# Patient Record
Sex: FEMALE | Race: WHITE | NOT HISPANIC OR LATINO | Employment: FULL TIME | URBAN - METROPOLITAN AREA
[De-identification: names, ages, dates, MRNs, and addresses within clinical notes are randomized per-mention and may not be internally consistent; named-entity substitution may affect disease eponyms.]

---

## 2017-04-28 ENCOUNTER — OFFICE VISIT (OUTPATIENT)
Dept: FAMILY MEDICINE | Facility: CLINIC | Age: 22
End: 2017-04-28
Payer: COMMERCIAL

## 2017-04-28 VITALS
TEMPERATURE: 98 F | BODY MASS INDEX: 27.44 KG/M2 | HEART RATE: 75 BPM | SYSTOLIC BLOOD PRESSURE: 130 MMHG | DIASTOLIC BLOOD PRESSURE: 70 MMHG | OXYGEN SATURATION: 100 % | WEIGHT: 208 LBS

## 2017-04-28 DIAGNOSIS — B49 FUNGAL INFECTION: Primary | ICD-10-CM

## 2017-04-28 PROCEDURE — 99213 OFFICE O/P EST LOW 20 MIN: CPT | Performed by: NURSE PRACTITIONER

## 2017-04-28 RX ORDER — CLOTRIMAZOLE 1 %
CREAM (GRAM) TOPICAL 2 TIMES DAILY
Qty: 15 G | Refills: 1 | Status: ON HOLD | OUTPATIENT
Start: 2017-04-28 | End: 2023-11-22

## 2017-04-28 NOTE — NURSING NOTE
"Chief Complaint   Patient presents with     Derm Problem       Initial /70 (BP Location: Right arm, Patient Position: Chair, Cuff Size: Adult Regular)  Pulse 75  Temp 98  F (36.7  C) (Oral)  Wt 208 lb (94.3 kg)  SpO2 100%  BMI 27.44 kg/m2 Estimated body mass index is 27.44 kg/(m^2) as calculated from the following:    Height as of 10/21/16: 6' 1\" (1.854 m).    Weight as of this encounter: 208 lb (94.3 kg).  Medication Reconciliation: complete.Ravi BARRAZA MA      "

## 2017-04-28 NOTE — MR AVS SNAPSHOT
"              After Visit Summary   2017    Laura Singh    MRN: [id number]           Patient Information     Date Of Birth          1995        Visit Information        Provider Department      2017 11:45 AM Aissatou Levine NP [facility]        Today's Diagnoses     Fungal infection    -  1       Follow-ups after your visit        Who to contact     If you have questions or need follow up information about today's clinic visit or your schedule please contact [facility] directly at [phone].  Normal or non-critical lab and imaging results will be communicated to you by [employer], letter or phone within 4 business days after the clinic has received the results. If you do not hear from us within 7 days, please contact the clinic through [employer] or phone. If you have a critical or abnormal lab result, we will notify you by phone as soon as possible.  Submit refill requests through [employer] or call your pharmacy and they will forward the refill request to us. Please allow 3 business days for your refill to be completed.          Additional Information About Your Visit        MyChart Information     [employer] lets you send messages to your doctor, view your test results, renew your prescriptions, schedule appointments and more. To sign up, go to [email or web address] . Click on \"Log in\" on the left side of the screen, which will take you to the Welcome page. Then click on \"Sign up Now\" on the right side of the page.     You will be asked to enter the access code listed below, as well as some personal information. Please follow the directions to create your username and password.     Your access code is: [email or web address]  Expires: 2017 12:13 PM     Your access code will  in 90 days. If you need help or a new code, please call your [facility] or [phone].        Care EveryWhere ID     This is your Care EveryWhere ID. This could be used by other " organizations to access your [facility] medical records  [id number]        Your Vitals Were     Pulse Temperature Pulse Oximetry BMI (Body Mass Index)          75 98  F (36.7  C) (Oral) 100% 27.44 kg/m2         Blood Pressure from Last 3 Encounters:   04/28/17 130/70   10/21/16 120/80    Weight from Last 3 Encounters:   04/28/17 208 lb (94.3 kg)   10/21/16 225 lb (102.1 kg)              Today, you had the following     No orders found for display         Today's Medication Changes          These changes are accurate as of: 4/28/17 12:13 PM.  If you have any questions, ask your nurse or doctor.               Start taking these medicines.        Dose/Directions    clotrimazole 1 % cream   Commonly known as:  LOTRIMIN   Used for:  Fungal infection   Started by:  Aissatou Levine, KIMBERLEE        Apply topically 2 times daily   Quantity:  15 g   Refills:  1            Where to get your medicines      These medications were sent to [facility]/pharmacy #[id number] - [place], [place] - [address] [address]  [address] [address] [address]    Hours:  [facility] Phone:  [phone]     clotrimazole 1 % cream                Primary Care Provider Office Phone # Fax #    Vishal Almanza [phone]       [facility] [address] [address]        Thank you!     Thank you for choosing [facility]  for your care. Our goal is always to provide you with excellent care. Hearing back from our patients is one way we can continue to improve our services. Please take a few minutes to complete the written survey that you may receive in the mail after your visit with us. Thank you!             Your Updated Medication List - Protect others around you: Learn how to safely use, store and throw away your medicines at www.disposemymeds.org.          This list is accurate as of: 4/28/17 12:13 PM.  Always use your most recent med list.                   Brand Name Dispense  Instructions for use    clotrimazole 1 % cream    LOTRIMIN    15 g    Apply topically 2 times daily       JUNE 1/20 PO

## 2017-04-28 NOTE — PROGRESS NOTES
SUBJECTIVE:                                                    Laura Singh is a 21 year old female who presents to clinic today for the following health issues:      Rash     Onset: 1 month    Description:   Location: right hand  Character: round, raised, red  Itching (Pruritis): no     Progression of Symptoms:  same    Accompanying Signs & Symptoms:  Fever: no   Body aches or joint pain: no   Sore throat symptoms: no   Recent cold symptoms: no    History:   Previous similar rash: no     Precipitating factors:   Exposure to similar rash: no   New exposures: None   Recent travel: YES- traveled to [place], traveled by [facility]    Alleviating factors:  none     Therapies Tried and outcome: otc hydrocortisone cream    Rash on right hand present for the past month. Seen by dermatology and told that it was a sensitivity to sun. Applied hydrocortisone and no improvement. Red, itchy and not improving.       Problem list and histories reviewed & adjusted, as indicated.  Additional history: none    Patient Active Problem List   Diagnosis   (none) - all problems resolved or deleted     History reviewed. No pertinent surgical history.    Social History   Substance Use Topics     Smoking status: Never Smoker     Smokeless tobacco: Never Used     Alcohol use No     History reviewed. No pertinent family history.        Reviewed and updated as needed this visit by clinical staff  Tobacco  Allergies  Meds  Problems  Med Hx  Surg Hx  Fam Hx  Soc Hx        Reviewed and updated as needed this visit by Provider  Allergies  Meds  Problems  Med Hx  Surg Hx  Fam Hx         ROS:  Constitutional, HEENT, cardiovascular, pulmonary, gi and gu systems are negative, except as otherwise noted.    OBJECTIVE:                                                    /70 (BP Location: Right arm, Patient Position: Chair, Cuff Size: Adult Regular)  Pulse 75  Temp 98  F (36.7  C) (Oral)  Wt 208 lb (94.3 kg)  SpO2 100%  BMI 27.44  kg/m2  Body mass index is 27.44 kg/(m^2).  GENERAL: healthy, alert and no distress  SKIN: red rash with border and centralized clearing. Scaling noted. Localized to the right hand.          ASSESSMENT/PLAN:                                                            1. Fungal infection  Will have patient apply lotrimin antifungal cream twice daily for the next two weeks. If no improvement, advised to call and can try a topical steroid cream.   - clotrimazole (LOTRIMIN) 1 % cream; Apply topically 2 times daily  Dispense: 15 g; Refill: 1        Aissatou Levine NP  [facility]

## 2018-07-09 ENCOUNTER — OFFICE VISIT (OUTPATIENT)
Dept: URGENT CARE | Facility: URGENT CARE | Age: 23
End: 2018-07-09
Payer: COMMERCIAL

## 2018-07-09 VITALS
OXYGEN SATURATION: 98 % | HEART RATE: 82 BPM | DIASTOLIC BLOOD PRESSURE: 72 MMHG | TEMPERATURE: 98.4 F | RESPIRATION RATE: 16 BRPM | WEIGHT: 220 LBS | SYSTOLIC BLOOD PRESSURE: 110 MMHG | HEIGHT: 72 IN | BODY MASS INDEX: 29.8 KG/M2

## 2018-07-09 DIAGNOSIS — L56.8 PHOTODERMATITIS DUE TO SUN: Primary | ICD-10-CM

## 2018-07-09 PROCEDURE — 99213 OFFICE O/P EST LOW 20 MIN: CPT | Performed by: FAMILY MEDICINE

## 2018-07-09 RX ORDER — TRIAMCINOLONE ACETONIDE 1 MG/G
CREAM TOPICAL 2 TIMES DAILY
Qty: 80 G | Refills: 1 | Status: ON HOLD | OUTPATIENT
Start: 2018-07-09 | End: 2023-11-22

## 2018-07-09 NOTE — MR AVS SNAPSHOT
After Visit Summary   7/9/2018    Laura Singh    MRN: [id number]           Patient Information     Date Of Birth          1995        Visit Information        Provider Department      7/9/2018 6:05 PM Trudy Slaughter MD [facility]        Today's Diagnoses     Photodermatitis due to sun    -  1      Care Instructions      Plant and Sun Dermatitis (Phytophotodermatitis)  Phytophotodermatitis is an inflammatory reaction of the skin. It occurs after contact with the leaves and stems of certain plants, followed by exposure to sunlight. It starts with a burning sensation followed by a red rash, and sometimes blisters. This starts about 24 hours after exposure. The rash gets worse over 1 to 3 days, then symptoms start to get better. There is usually no itching. The rash is not contagious. If you are not able to link the skin reaction to a plant, it could be from medicines that have photoallergic reactions such as some sunscreens, NSAIDs, or antibiotics.  As symptoms get better, the rash may look darker or lighter for weeks to months before it fully fades. Sometimes these color changes are permanent.  This skin reaction is not an allergic or immune reaction, but a reaction between the plant substance and the skin cells. Sunlight is needed to start the reaction. The lining of the skin cells is damaged. The cells die and flake off (like a bad sunburn).  The plants that cause this reaction are found throughout the [place] and the world. Plants include meadow grass, parsley, carrots, parsnip, celery, limes, fig leaves, lemon, mustard, and chrysanthemums.  Treatment involves washing the skin to remove any remaining plant oils, avoiding sun during the acute phase of the rash, and treating symptoms.  Home care  The following guidelines can help you care for yourself at home:    On the first day, use ice packs to relieve severe pain. Some over-the-counter first-aid creams and sprays  contain an anesthetic which also relieves pain.    If a dressing was applied, change it once a day. If the bandage sticks, soak it off in warm water.    Wash the affected area every day with soap and water. Pat dry with a clean towel. Use hydrocortisone cream to help with inflammation.    You may use acetaminophen or ibuprofen to control pain, unless another medicine was given.  If you have chronic liver or kidney disease or ever had a stomach ulcer or gastrointestinal bleeding, talk with your doctor before using these medicines. Do not use ibuprofen in children under 6 months of age.    If you know what plant caused your reaction, avoid this plant in the future.  Follow-up care  Follow up with your doctor, or as advised. The skin usually heals without infection. Occasionally, an infection may occur even with proper treatment. Watch for the signs of infection listed below.  When to seek medical advice  Call your healthcare provider right away if any of these occur:    Increasing pain    Increasing redness, swelling, or pus coming from the wound    Fever of 100.4 F (38 C) or higher, or as directed by your healthcare provider  Date Last Reviewed: 9/1/2016 2000-2017 [employer]. [address] [place] [address]. All rights reserved. This information is not intended as a substitute for professional medical care. Always follow your healthcare professional's instructions.                Follow-ups after your visit        Who to contact     If you have questions or need follow up information about today's clinic visit or your schedule please contact [facility] directly at [phone].  Normal or non-critical lab and imaging results will be communicated to you by MyChart, letter or phone within 4 business days after the clinic has received the results. If you do not hear from us within 7 days, please contact the clinic through MyChart or phone. If you have a critical or  "abnormal lab result, we will notify you by phone as soon as possible.  Submit refill requests through [employer] or call your pharmacy and they will forward the refill request to us. Please allow 3 business days for your refill to be completed.          Additional Information About Your Visit        Care EveryWhere ID     This is your Care EveryWhere ID. This could be used by other organizations to access your [place] medical records  [id number]        Your Vitals Were     Pulse Temperature Respirations Height Last Period Pulse Oximetry    82 98.4  F (36.9  C) (Oral) 16 6' 1\" (1.854 m) 06/25/2018 (Exact Date) 98%    Breastfeeding? BMI (Body Mass Index)                No 29.03 kg/m2           Blood Pressure from Last 3 Encounters:   07/09/18 110/72   04/28/17 130/70   10/21/16 120/80    Weight from Last 3 Encounters:   07/09/18 220 lb (99.8 kg)   04/28/17 208 lb (94.3 kg)   10/21/16 225 lb (102.1 kg)              Today, you had the following     No orders found for display         Today's Medication Changes          These changes are accurate as of 7/9/18  6:56 PM.  If you have any questions, ask your nurse or doctor.               Start taking these medicines.        Dose/Directions    triamcinolone 0.1 % cream   Commonly known as:  KENALOG   Used for:  Photodermatitis due to sun   Started by:  Trudy Slaughter MD        Apply topically 2 times daily   Quantity:  80 g   Refills:  1            Where to get your medicines      These medications were sent to [facility]/pharmacy #[id number] - [place], [place] - [address] [address]  [address] [address] [address]    Hours:  [facility] Phone:  [phone]     triamcinolone 0.1 % cream                Primary Care Provider Office Phone # Fax #    Vishal Almanza [phone]       [facility] [address] [address]        Equal Access to Services     ОЛЬГА WALLACE AH: Hadii beverly [place] " harsha steennirmal chenhermelinda snell ah. So [facility] [phone].    ATENCIÓN: Si leslie frost, tiene a burden disposición servicios gratuitos de asistencia lingüística. Andrewame al [phone].    We comply with applicable federal civil rights laws and [place] laws. We do not discriminate on the basis of race, color, national origin, age, disability, sex, sexual orientation, or gender identity.            Thank you!     Thank you for choosing [facility]  for your care. Our goal is always to provide you with excellent care. Hearing back from our patients is one way we can continue to improve our services. Please take a few minutes to complete the written survey that you may receive in the mail after your visit with us. Thank you!             Your Updated Medication List - Protect others around you: Learn how to safely use, store and throw away your medicines at www.disposemymeds.org.          This list is accurate as of 7/9/18  6:56 PM.  Always use your most recent med list.                   Brand Name Dispense Instructions for use Diagnosis    clotrimazole 1 % cream    LOTRIMIN    15 g    Apply topically 2 times daily    Fungal infection       JUNEL 1/20 PO           triamcinolone 0.1 % cream    KENALOG    80 g    Apply topically 2 times daily    Photodermatitis due to sun

## 2018-07-09 NOTE — PATIENT INSTRUCTIONS
Plant and Sun Dermatitis (Phytophotodermatitis)  Phytophotodermatitis is an inflammatory reaction of the skin. It occurs after contact with the leaves and stems of certain plants, followed by exposure to sunlight. It starts with a burning sensation followed by a red rash, and sometimes blisters. This starts about 24 hours after exposure. The rash gets worse over 1 to 3 days, then symptoms start to get better. There is usually no itching. The rash is not contagious. If you are not able to link the skin reaction to a plant, it could be from medicines that have photoallergic reactions such as some sunscreens, NSAIDs, or antibiotics.  As symptoms get better, the rash may look darker or lighter for weeks to months before it fully fades. Sometimes these color changes are permanent.  This skin reaction is not an allergic or immune reaction, but a reaction between the plant substance and the skin cells. Sunlight is needed to start the reaction. The lining of the skin cells is damaged. The cells die and flake off (like a bad sunburn).  The plants that cause this reaction are found throughout the [place] and the world. Plants include meadow grass, parsley, carrots, parsnip, celery, limes, fig leaves, lemon, mustard, and chrysanthemums.  Treatment involves washing the skin to remove any remaining plant oils, avoiding sun during the acute phase of the rash, and treating symptoms.  Home care  The following guidelines can help you care for yourself at home:    On the first day, use ice packs to relieve severe pain. Some over-the-counter first-aid creams and sprays contain an anesthetic which also relieves pain.    If a dressing was applied, change it once a day. If the bandage sticks, soak it off in warm water.    Wash the affected area every day with soap and water. Pat dry with a clean towel. Use hydrocortisone cream to help with inflammation.    You may use acetaminophen or ibuprofen to control pain, unless another medicine was  given.  If you have chronic liver or kidney disease or ever had a stomach ulcer or gastrointestinal bleeding, talk with your doctor before using these medicines. Do not use ibuprofen in children under 6 months of age.    If you know what plant caused your reaction, avoid this plant in the future.  Follow-up care  Follow up with your doctor, or as advised. The skin usually heals without infection. Occasionally, an infection may occur even with proper treatment. Watch for the signs of infection listed below.  When to seek medical advice  Call your healthcare provider right away if any of these occur:    Increasing pain    Increasing redness, swelling, or pus coming from the wound    Fever of 100.4 F (38 C) or higher, or as directed by your healthcare provider  Date Last Reviewed: 9/1/2016 2000-2017 [employer]. [address] [place] [address], [place] [address]. All rights reserved. This information is not intended as a substitute for professional medical care. Always follow your healthcare professional's instructions.

## 2018-07-10 NOTE — PROGRESS NOTES
"SUBJECTIVE:  Chief Complaint   Patient presents with     Derm Problem     mid chest x 3 days, applied some benedryl and hydrocortizone, stings, redness       Laura Singh is a 22 year old female who presents to the clinic today for a rash.  Onset of rash was 3 day(s) ago.   Rash is sudden onset, still present and constant.  Location of the rash: chest. Vshaple of the collar Quality/symptoms of rash: itching, burning, painful, red and blistering   Symptoms are moderate and rash seems to be not changing over the course of time.  Previous history of a similar rash? No  Recent exposure history: was outdoors with sun exposure  Not taking medications or skin products that make her sun sensitive    Associated symptoms include: nothing.  Patient denies: throat tightness, wheezing, cough, tongue/lip swelling, sore throat and URI symptoms.    History reviewed. No pertinent past medical history.  Patient Active Problem List   Diagnosis   (none) - all problems resolved or deleted       ALLERGIES:  Review of patient's allergies indicates no known allergies.      Current Outpatient Prescriptions on File Prior to Visit:  clotrimazole (LOTRIMIN) 1 % cream Apply topically 2 times daily   Norethindrone Acet-Ethinyl Est (JUNEL 1/20 PO)      No current facility-administered medications on file prior to visit.     Social History   Substance Use Topics     Smoking status: Never Smoker     Smokeless tobacco: Never Used     Alcohol use No       History reviewed. No pertinent family history.      ROS:  CONSTITUTIONAL:NEGATIVE for fever, chills   EYES: NEGATIVE for vision changes or irritation  ENT/MOUTH: NEGATIVE for ear, mouth and throat problems  RESP:NEGATIVE for significant cough or SOB    EXAM:   /72 (BP Location: Right arm, Patient Position: Chair, Cuff Size: Adult Large)  Pulse 82  Temp 98.4  F (36.9  C) (Oral)  Resp 16  Ht 6' 1\" (1.854 m)  Wt 220 lb (99.8 kg)  LMP 06/25/2018 (Exact Date)  SpO2 98%  Breastfeeding? No "  BMI 29.03 kg/m2  GENERAL: alert, no acute distress.  SKIN: Rash description:    Distribution: localized  Location: chest  - in v shape in the shape of collar  Color: red,  Lesion type: maculopapular, confluent with tenderness and inflammation  GENERAL APPEARANCE: healthy, alert and no distress  EYES: EOMI,    conjunctiva clear  NECK: supple, non-tender to palpation, no adenopathy noted  RESP: lungs clear to auscultation - no rales, rhonchi or wheezes  CV: regular rates and rhythm, normal S1 S2, no murmur noted    ASSESSMENT:  Photodermatitis due to sun     - triamcinolone (KENALOG) 0.1 % cream; Apply topically 2 times daily     Triamcinolone cream, apply bid for itching, inflammation          Follow-up with primary clinic if not improving

## 2020-01-09 ENCOUNTER — OFFICE VISIT (OUTPATIENT)
Dept: URGENT CARE | Facility: URGENT CARE | Age: 25
End: 2020-01-09
Payer: COMMERCIAL

## 2020-01-09 VITALS
TEMPERATURE: 97.7 F | HEIGHT: 72 IN | OXYGEN SATURATION: 98 % | WEIGHT: 200 LBS | BODY MASS INDEX: 27.09 KG/M2 | SYSTOLIC BLOOD PRESSURE: 128 MMHG | DIASTOLIC BLOOD PRESSURE: 79 MMHG | HEART RATE: 86 BPM

## 2020-01-09 DIAGNOSIS — R11.2 NAUSEA AND VOMITING, INTRACTABILITY OF VOMITING NOT SPECIFIED, UNSPECIFIED VOMITING TYPE: ICD-10-CM

## 2020-01-09 DIAGNOSIS — J01.10 ACUTE NON-RECURRENT FRONTAL SINUSITIS: ICD-10-CM

## 2020-01-09 DIAGNOSIS — R68.89 FLU-LIKE SYMPTOMS: Primary | ICD-10-CM

## 2020-01-09 LAB
FLUAV+FLUBV AG SPEC QL: NEGATIVE
FLUAV+FLUBV AG SPEC QL: NEGATIVE
SPECIMEN SOURCE: NORMAL

## 2020-01-09 PROCEDURE — 99213 OFFICE O/P EST LOW 20 MIN: CPT | Performed by: FAMILY MEDICINE

## 2020-01-09 PROCEDURE — 87804 INFLUENZA ASSAY W/OPTIC: CPT | Performed by: FAMILY MEDICINE

## 2020-01-09 RX ORDER — IBUPROFEN 200 MG
200 TABLET ORAL EVERY 4 HOURS PRN
Status: ON HOLD | COMMUNITY
End: 2023-11-22

## 2020-01-09 RX ORDER — AMOXICILLIN 500 MG/1
500 TABLET, FILM COATED ORAL 2 TIMES DAILY
Status: ON HOLD | COMMUNITY
End: 2023-11-22

## 2020-01-09 ASSESSMENT — MIFFLIN-ST. JEOR: SCORE: 1775.07

## 2020-01-10 NOTE — PATIENT INSTRUCTIONS
Sinus symptoms of congestion/frontal sinus pressure  -- fluticasone nasal spray once a day into each nostril  -- when congested use neti pots/nasal saline rinses      STOP the amoxicillin as this can be causing your upset stomach      Take tylenol every 4-6 hours for pain      Stay hydrated: 4-6 water bottles a day      If you develop new or worsening symptoms please call the clinic or return to be evaluated

## 2020-01-10 NOTE — PROGRESS NOTES
"Subjective:   Laura Singh is a 24 year old female who presents for   Chief Complaint   Patient presents with     Urgent Care     Pt in clinic to have eval for flu symptoms.     Nasal Congestion     Headache     Vomiting     Diarrhea     Sweats     Generalized Body Aches     Fatigue     Patient went on virtuwell yesterday and given amoxicillin -- for presumed sinusitis. Has been vomiting since then  Has had amoxicillin in the past. She did vomit one other time about 3 days ago (isolated episode)   She denies sinus pressure of maxillary sinuses but has frontal headache present every day. Occasionally gets a sinus infection. Feels warm intermittently and clammy at times. Reports 1 week of illness, started with loose stools and not feeling well at that time after eating noodles of some sort with her family (no others were sick). No present cough at this time. No diarrhea at this time.    Denies abdominal pain. 5 days total of sinus pressure  She did not get the flu shot this year.    Patient works for a bank  There are no active problems to display for this patient.      Current Outpatient Medications   Medication     amoxicillin (AMOXIL) 500 MG tablet     ibuprofen (ADVIL/MOTRIN) 200 MG tablet     Norethindrone Acet-Ethinyl Est (JUNEL 1/20 PO)     clotrimazole (LOTRIMIN) 1 % cream     triamcinolone (KENALOG) 0.1 % cream     No current facility-administered medications for this visit.        ROS:  As above per HPI    Objective:   /79   Pulse 86   Temp 97.7  F (36.5  C) (Oral)   Ht 1.854 m (6' 1\")   Wt 90.7 kg (200 lb)   SpO2 98%   BMI 26.39 kg/m  , Body mass index is 26.39 kg/m .  Gen:  NAD, well-nourished, sitting in chair comfortably  HEENT: EOMI, sclera anicteric, Head normocephalic, ; nares patent; moist mucous membranes  Neck: trachea midline, no thyromegaly  CV:  Hemodynamically stable, RRR  Pulm:  no increased work of breathing , CTAB, no wheezes/rales/rhonchi   ABD: soft, non-distended, " normoactive, no palpable masses, no guarding  Extrem: no cyanosis, edema or clubbing  Skin: no obvious rashes or abnormalities  Psych: Euthymic, linear thoughts, normal rate of speech    Results for orders placed or performed in visit on 01/09/20   Influenza A/B antigen     Status: None   Result Value Ref Range    Influenza A/B Agn Specimen Nasopharyngeal     Influenza A Negative NEG^Negative    Influenza B Negative NEG^Negative     Assessment & Plan:     Laura Singh, 24 year old female who presents with:  Flu-like symptoms  Frontal Sinusitis  Recommend fluticasone nasal spray and nasal saline irrigation/rinses as her symptoms present just 5 days and may be more indicative of a viral infection.   - Influenza A/B antigen    Nausea and vomiting, intractability of vomiting not specified, unspecified vomiting type  Possible intolerance to amoxicillin, recommend she discontinue as she likely has a viral sinusitis (reports only 5 days of sinus frontal pressure) . The vomiting > 1 week ago likely from food poisoning. Current vomiting likely from antibiotic use, symptoms not convincing for viral gastroenteritis (normal abd exam and without loose stools). Good oral hydration encouraged. F/u if not better in next few days      Rolando Chao MD   [facility]    The use of Dragon/[employer] dictation services may have been used to construct the content in this note; any grammatical or spelling errors are non-intentional. Please contact the author of this note directly if you are in need of any clarification.

## 2023-11-22 ENCOUNTER — TRANSFERRED RECORDS (OUTPATIENT)
Dept: OBGYN | Facility: CLINIC | Age: 28
End: 2023-11-22

## 2023-11-22 ENCOUNTER — HOSPITAL ENCOUNTER (OUTPATIENT)
Facility: CLINIC | Age: 28
Discharge: HOME | End: 2023-11-23
Attending: OBSTETRICS & GYNECOLOGY | Admitting: OBSTETRICS & GYNECOLOGY
Payer: COMMERCIAL

## 2023-11-22 DIAGNOSIS — F41.9 ANXIETY: Primary | ICD-10-CM

## 2023-11-22 DIAGNOSIS — I10 CHRONIC HYPERTENSION: ICD-10-CM

## 2023-11-22 LAB
ALBUMIN MFR UR ELPH: <6 MG/DL
ALBUMIN SERPL BCG-MCNC: 3.7 G/DL (ref 3.5–5.2)
ALP SERPL-CCNC: 79 U/L (ref 40–150)
ALT SERPL W P-5'-P-CCNC: 15 U/L (ref 0–50)
ANION GAP SERPL CALCULATED.3IONS-SCNC: 9 MMOL/L (ref 7–15)
AST SERPL W P-5'-P-CCNC: 17 U/L (ref 0–45)
BILIRUB SERPL-MCNC: <0.2 MG/DL
BUN SERPL-MCNC: 7.2 MG/DL (ref 6–20)
CALCIUM SERPL-MCNC: 9.2 MG/DL (ref 8.6–10)
CHLORIDE SERPL-SCNC: 101 MMOL/L (ref 98–107)
CLUE CELLS: PRESENT
CREAT SERPL-MCNC: 0.52 MG/DL (ref 0.51–0.95)
CREAT UR-MCNC: 44.5 MG/DL
CRYSTALS AMN MICRO: NORMAL
DEPRECATED HCO3 PLAS-SCNC: 25 MMOL/L (ref 22–29)
EGFRCR SERPLBLD CKD-EPI 2021: >90 ML/MIN/1.73M2
ERYTHROCYTE [DISTWIDTH] IN BLOOD BY AUTOMATED COUNT: 13.5 % (ref 10–15)
GLUCOSE SERPL-MCNC: 92 MG/DL (ref 70–99)
HCT VFR BLD AUTO: 37.3 % (ref 35–47)
HGB BLD-MCNC: 12.4 G/DL (ref 11.7–15.7)
MCH RBC QN AUTO: 29.3 PG (ref 26.5–33)
MCHC RBC AUTO-ENTMCNC: 33.2 G/DL (ref 31.5–36.5)
MCV RBC AUTO: 88 FL (ref 78–100)
PLATELET # BLD AUTO: 328 10E3/UL (ref 150–450)
POTASSIUM SERPL-SCNC: 3.8 MMOL/L (ref 3.4–5.3)
PROT SERPL-MCNC: 6.6 G/DL (ref 6.4–8.3)
PROT/CREAT 24H UR: NORMAL MG/G{CREAT}
RBC # BLD AUTO: 4.23 10E6/UL (ref 3.8–5.2)
SODIUM SERPL-SCNC: 135 MMOL/L (ref 135–145)
TRICHOMONAS, WET PREP: ABNORMAL
WBC # BLD AUTO: 13.4 10E3/UL (ref 4–11)
WBC'S/HIGH POWER FIELD, WET PREP: ABNORMAL
YEAST, WET PREP: ABNORMAL

## 2023-11-22 PROCEDURE — 87210 SMEAR WET MOUNT SALINE/INK: CPT | Performed by: OBSTETRICS & GYNECOLOGY

## 2023-11-22 PROCEDURE — 36415 COLL VENOUS BLD VENIPUNCTURE: CPT | Performed by: OBSTETRICS & GYNECOLOGY

## 2023-11-22 PROCEDURE — G0463 HOSPITAL OUTPT CLINIC VISIT: HCPCS

## 2023-11-22 PROCEDURE — 84156 ASSAY OF PROTEIN URINE: CPT | Performed by: OBSTETRICS & GYNECOLOGY

## 2023-11-22 PROCEDURE — 250N000013 HC RX MED GY IP 250 OP 250 PS 637: Performed by: OBSTETRICS & GYNECOLOGY

## 2023-11-22 PROCEDURE — 85027 COMPLETE CBC AUTOMATED: CPT | Performed by: OBSTETRICS & GYNECOLOGY

## 2023-11-22 PROCEDURE — 80053 COMPREHEN METABOLIC PANEL: CPT | Performed by: OBSTETRICS & GYNECOLOGY

## 2023-11-22 RX ORDER — SODIUM CHLORIDE, SODIUM LACTATE, POTASSIUM CHLORIDE, CALCIUM CHLORIDE 600; 310; 30; 20 MG/100ML; MG/100ML; MG/100ML; MG/100ML
10-125 INJECTION, SOLUTION INTRAVENOUS CONTINUOUS
Status: DISCONTINUED | OUTPATIENT
Start: 2023-11-22 | End: 2023-11-23 | Stop reason: HOSPADM

## 2023-11-22 RX ORDER — ASPIRIN 81 MG/1
81 TABLET, CHEWABLE ORAL DAILY
Status: ON HOLD | COMMUNITY
End: 2024-03-04

## 2023-11-22 RX ORDER — NIFEDIPINE 30 MG/1
30 TABLET, EXTENDED RELEASE ORAL DAILY
Status: COMPLETED | OUTPATIENT
Start: 2023-11-22 | End: 2023-11-22

## 2023-11-22 RX ORDER — NIFEDIPINE 30 MG/1
30 TABLET, EXTENDED RELEASE ORAL DAILY
Status: DISCONTINUED | OUTPATIENT
Start: 2023-11-23 | End: 2023-11-22

## 2023-11-22 RX ORDER — METRONIDAZOLE 500 MG/1
500 TABLET ORAL 2 TIMES DAILY
Status: DISCONTINUED | OUTPATIENT
Start: 2023-11-22 | End: 2023-11-23 | Stop reason: HOSPADM

## 2023-11-22 RX ORDER — LABETALOL HYDROCHLORIDE 5 MG/ML
20 INJECTION, SOLUTION INTRAVENOUS
Status: DISCONTINUED | OUTPATIENT
Start: 2023-11-22 | End: 2023-11-23 | Stop reason: HOSPADM

## 2023-11-22 RX ORDER — NIFEDIPINE 10 MG/1
10-20 CAPSULE ORAL
Status: DISCONTINUED | OUTPATIENT
Start: 2023-11-22 | End: 2023-11-23 | Stop reason: HOSPADM

## 2023-11-22 RX ORDER — LIDOCAINE 40 MG/G
CREAM TOPICAL
Status: DISCONTINUED | OUTPATIENT
Start: 2023-11-22 | End: 2023-11-23 | Stop reason: HOSPADM

## 2023-11-22 RX ADMIN — NIFEDIPINE 10 MG: 10 CAPSULE ORAL at 20:11

## 2023-11-22 RX ADMIN — SERTRALINE HYDROCHLORIDE 100 MG: 50 TABLET ORAL at 22:06

## 2023-11-22 RX ADMIN — METRONIDAZOLE 500 MG: 500 TABLET ORAL at 22:06

## 2023-11-22 RX ADMIN — NIFEDIPINE 30 MG: 30 TABLET, FILM COATED, EXTENDED RELEASE ORAL at 22:06

## 2023-11-22 ASSESSMENT — ACTIVITIES OF DAILY LIVING (ADL)
ADLS_ACUITY_SCORE: 35

## 2023-11-23 ENCOUNTER — HOSPITAL ENCOUNTER (OUTPATIENT)
Facility: CLINIC | Age: 28
End: 2023-11-23
Admitting: OBSTETRICS & GYNECOLOGY
Payer: COMMERCIAL

## 2023-11-23 VITALS
HEIGHT: 72 IN | SYSTOLIC BLOOD PRESSURE: 142 MMHG | WEIGHT: 293 LBS | DIASTOLIC BLOOD PRESSURE: 66 MMHG | TEMPERATURE: 98 F | RESPIRATION RATE: 18 BRPM | BODY MASS INDEX: 39.68 KG/M2

## 2023-11-23 PROCEDURE — G0463 HOSPITAL OUTPT CLINIC VISIT: HCPCS

## 2023-11-23 PROCEDURE — 250N000013 HC RX MED GY IP 250 OP 250 PS 637: Performed by: OBSTETRICS & GYNECOLOGY

## 2023-11-23 RX ORDER — METRONIDAZOLE 500 MG/1
500 TABLET ORAL 2 TIMES DAILY
Qty: 14 TABLET | Refills: 0 | Status: SHIPPED | OUTPATIENT
Start: 2023-11-23 | End: 2023-11-30

## 2023-11-23 RX ORDER — NIFEDIPINE 30 MG/1
30 TABLET, EXTENDED RELEASE ORAL DAILY
Qty: 14 TABLET | Refills: 0 | Status: ON HOLD | OUTPATIENT
Start: 2023-11-23 | End: 2024-03-04

## 2023-11-23 RX ORDER — NIFEDIPINE 30 MG
30 TABLET, EXTENDED RELEASE ORAL DAILY
Qty: 90 TABLET | Refills: 3 | Status: ON HOLD | OUTPATIENT
Start: 2023-11-23 | End: 2024-03-05

## 2023-11-23 RX ADMIN — METRONIDAZOLE 500 MG: 500 TABLET ORAL at 08:05

## 2023-11-23 ASSESSMENT — ACTIVITIES OF DAILY LIVING (ADL)
ADLS_ACUITY_SCORE: 35

## 2023-11-23 NOTE — PLAN OF CARE
Data: [facility]: 2023  6:37 PM.  Reason for maternal/fetal assessment is leaking vaginal fluid. Patient reports that she thought maybe she ruptured a week ago but never mentioned it to her doctor because she said it was such a small amount.  Pt stated there was more fluid today.  Patient is a .  Prenatal record reviewed. Pregnancy has been uncomplicated..  Gestational Age 22w6d. VSS. Fetal movement active. Patient denies uterine contractions, vaginal bleeding, abdominal pain, pelvic pressure, nausea, vomiting, headache, visual disturbances, epigastric or URQ pain, significant edema. Support person is present.   Action: Verbal consent for EFM. Triage assessment completed. Bill of rights reviewed.  Response: Patient verbalized agreement with plan. Will contact Dr Carol Roberto with update and for further orders.

## 2023-11-23 NOTE — DISCHARGE SUMMARY
See daily progress note.     Started on Nifed 30mg for cHTN and increased to 100mg Zoloft for anxiety. Follow up in clinic next week and check BPs daily at home.     RASHID REED MD  11/23/23, 7:15 AM

## 2023-11-23 NOTE — PLAN OF CARE
Data: Patient assessed in the [facility] for elevated blood pressures.  Cervical exam not examined.  Membranes intact.  Contractions/uterine assessment none.  Action:  Presumed adequate fetal oxygenation documented (see flow record). Discharge instructions reviewed.  Patient instructed to report change in fetal movement, vaginal leaking of fluid or bleeding, abdominal pain, or any concerns related to the pregnancy to her nurse/physician.    Response: Orders to discharge home per Lorena Booker.  Patient verbalized understanding of education and verbalized agreement with plan. Discharged to home at 0825.

## 2023-11-23 NOTE — PROGRESS NOTES
"Subjective:  28 year old  at 23w0d who presented with concern for leaking but no e/o PROM, then admitted for elevated BPs.    No HA, vis changes, swelling, RUQ pain. Started on Nifedipine 30mg XL last night.     Objective:  Vitals:   Last vitals: /58 (BP Location: Right arm, Patient Position: Semi-Garcia's, Cuff Size: Adult Large)   Temp 97.4  F (36.3  C) (Oral)   Resp 18   Ht 1.854 m (6' 1\")   Wt 145.1 kg (320 lb)   BMI 42.22 kg/m    Vital Range in last 24 hours:  Temp:  [97.4  F (36.3  C)-97.8  F (36.6  C)] 97.4  F (36.3  C)  Resp:  [18] 18  BP: (123-174)/(58-83) 123/58  General: In no acute distress.  Cardiovascular: Regular rate  Pulmonary: Non-labored  Abdomen: Gravid    Hemoglobin   Date Value Ref Range Status   2023 12.4 11.7 - 15.7 g/dL Final     Platelet Count   Date Value Ref Range Status   2023 328 150 - 450 10e3/uL Final     AST   Date Value Ref Range Status   2023 17 0 - 45 U/L Final     Comment:     Reference intervals for this test were updated on 2023 to more accurately reflect our healthy population. There may be differences in the flagging of prior results with similar values performed with this method. Interpretation of those prior results can be made in the context of the updated reference intervals.     ALT   Date Value Ref Range Status   2023 15 0 - 50 U/L Final     Comment:     Reference intervals for this test were updated on 2023 to more accurately reflect our healthy population. There may be differences in the flagging of prior results with similar values performed with this method. Interpretation of those prior results can be made in the context of the updated reference intervals.       Creatinine   Date Value Ref Range Status   2023 0.52 0.51 - 0.95 mg/dL Final     UPC too low to calculate    Assessment/Plan  Laura Singh is a 28 year old  presents with cHTN vs gHTN  # HTN - has had elevated BPs in clinic but decrease by end of " visit so has been called white coat HTN. With today's presentation, could be more of a cHTN picture vs less likely gHTN at 22w.  - Continue Nifed 30 XL  - Labs within normal limits  - Pt to get BP cuff for home  - Follow up in office next week  - May need to consider increase in zoloft    RASHID REED MD

## 2023-11-23 NOTE — PROVIDER NOTIFICATION
11/22/23 1952   Provider Notification   Provider Name/Title Dr. KEN Roberto   Method of Notification Phone   Request Evaluate - Remote   Notification Reason Patient Arrived     Dr. Roberto notified of Pt. Arrival. Fern and Wet prep for rule out rupture. Two severe blood pressures noted in the last hour, no s/s Pre-E noted.    TORB for CBC, CMP, protein random urine and Nifedipine order set. Notify when labs are back.

## 2023-11-23 NOTE — DISCHARGE INSTRUCTIONS
"Home Blood Pressure Test: About This Test  What is it?     A home blood pressure test allows you to keep track of your blood pressure at home. Blood pressure is a measure of the force of blood against the walls of your arteries. Blood pressure readings include two numbers, such as 130/80 (say \"130 over 80\"). The first number is the systolic pressure. The second number is the diastolic pressure.  Why is this test done?  You may do this test at home to:  Find out if you have high blood pressure.  Track your blood pressure if you have high blood pressure.  Track how well medicine is working to reduce high blood pressure.  Check how lifestyle changes, such as weight loss and exercise, are affecting blood pressure.  How do you prepare for the test?  For at least 30 minutes before you take your blood pressure, don't exercise, drink caffeine, or smoke. Empty your bladder before the test. Sit quietly with your back straight and both feet on the floor for at least 5 minutes. This helps you take your blood pressure while you feel comfortable and relaxed.  How is the test done?  If your doctor recommends it, take your blood pressure twice a day. Take it in the morning and evening.  Sit with your arm slightly bent and resting on a table so that your upper arm is at the same level as your heart.  Use the same arm each time you take your blood pressure.  Place the blood pressure cuff on the bare skin of your upper arm. You may have to roll up your sleeve, remove your arm from the sleeve, or take your shirt off.  Wrap the blood pressure cuff around your upper arm so that the lower edge of the cuff is about 1 inch above the bend of your elbow.  Do not move, talk, or text while you take your blood pressure.  Follow the instructions that came with your blood pressure monitor. They might be different from the following.  Press the on/off button on the automatic monitor. Then you may need to wait until the screen says the monitor is " "ready.  Press the start button. The cuff will inflate and deflate by itself.  Your blood pressure numbers will appear on the screen.  Wait one minute and take your blood pressure again.  If your monitor does not automatically save your numbers, write them in your log book, along with the date and time.  Follow-up care is a key part of your treatment and safety. Be sure to make and go to all appointments, and call your doctor if you are having problems. It's also a good idea to keep a list of the medicines you take.  Where can you learn more?  Go to https://www.[employer].net/patiented  Enter C427 in the search box to learn more about \"Home Blood Pressure Test: About This Test.\"  Current as of: June 25, 2023               Content Version: 13.8    1824-5242 [employer].   Care instructions adapted under license by your healthcare professional. If you have questions about a medical condition or this instruction, always ask your healthcare professional. [employer] disclaims any warranty or liability for your use of this information.    Discharge Instruction for Undelivered Patients      You were seen for:  High blood pressure  We Consulted: Dr. Roberto, Dr. Booker  You had (Test or Medicine): blood pressure monitoring, labs, and doppler fetal heart tones.     Diet:   Drink 8 to 12 glasses of liquids (milk, juice, water) every day.  You may eat meals and snacks.     Activity:  Count fetal kicks everyday (see handout)  Call your doctor or nurse midwife if your baby is moving less than usual.     Call your provider if you notice:  Swelling in your face or increased swelling in your hands or legs.  Headaches that are not relieved by Tylenol (acetaminophen).  Changes in your vision (blurring: seeing spots or stars.)  Nausea (sick to your stomach) and vomiting (throwing up).   Weight gain of 5 pounds or more per week.  Heartburn that doesn't go away.  Signs of bladder infection: pain when you urinate " (use the toilet), need to go more often and more urgently.  The bag of myles (rupture of membranes) breaks, or you notice leaking in your underwear.  Bright red blood in your underwear.  Abdominal (lower belly) or stomach pain.  For first baby: Contractions (tightening) less than 5 minutes apart for one hour or more.  *If less than 34 weeks: Contractions (tightening) more than 6 times in one hour.  Increase or change in vaginal discharge (note the color and amount)  Other: Monitor blood pressure daily    Follow-up:  Make an appointment to be seen on next week ( 11/27-12/1)

## 2023-11-23 NOTE — PROVIDER NOTIFICATION
11/22/23 2124   Provider Notification   Provider Name/Title Dr. CHRISTI Roberto   Method of Notification Phone   Request Evaluate - Remote   Notification Reason Lab/Diagnostic Study     Dr. Roberto notified of blood pressures and lab results. No severe pressures noted after 1x Nifedipine 10mg, labs currently unremarkable apart from clue cells present from wet prep. Pt. Also asking about increase in anti anxiety medication dosage.    VORB for 30mg Procardia XL x1, start Metronidazole 500mg BID for 7 days, increase sertraline to 100mg. Plan to stay overnight for observation, Q4 blood pressures and dop tones Q shift.

## 2023-11-23 NOTE — PROVIDER NOTIFICATION
11/23/23 0731   Provider Notification   Provider Name/Title Dr. Booker   Method of Notification Phone     Writer confirmed discharge order for this am. Procardia ordered to patients pharmacy, MD to add on Flagyl. Pt will receive morning dose at hospital, will need dose tonight at home. Order placed for home blood pressure cuff, pt to take blood pressure daily at home. Follow up next week in clinic.

## 2024-02-29 ENCOUNTER — HOSPITAL ENCOUNTER (INPATIENT)
Facility: CLINIC | Age: 29
LOS: 6 days | Discharge: HOME | End: 2024-03-06
Attending: STUDENT IN AN ORGANIZED HEALTH CARE EDUCATION/TRAINING PROGRAM | Admitting: STUDENT IN AN ORGANIZED HEALTH CARE EDUCATION/TRAINING PROGRAM
Payer: COMMERCIAL

## 2024-02-29 DIAGNOSIS — O14.90 PRE-ECLAMPSIA, ANTEPARTUM: ICD-10-CM

## 2024-02-29 DIAGNOSIS — Z98.891 S/P CESAREAN SECTION: Primary | ICD-10-CM

## 2024-02-29 LAB
ABO/RH(D): NORMAL
ALBUMIN MFR UR ELPH: 20.4 MG/DL
ALBUMIN SERPL BCG-MCNC: 3.3 G/DL (ref 3.5–5.2)
ALP SERPL-CCNC: 159 U/L (ref 40–150)
ALT SERPL W P-5'-P-CCNC: 14 U/L (ref 0–50)
ANION GAP SERPL CALCULATED.3IONS-SCNC: 11 MMOL/L (ref 7–15)
ANTIBODY SCREEN: NEGATIVE
AST SERPL W P-5'-P-CCNC: 15 U/L (ref 0–45)
BILIRUB SERPL-MCNC: <0.2 MG/DL
BUN SERPL-MCNC: 9.9 MG/DL (ref 6–20)
CALCIUM SERPL-MCNC: 9 MG/DL (ref 8.6–10)
CHLORIDE SERPL-SCNC: 101 MMOL/L (ref 98–107)
CREAT SERPL-MCNC: 0.59 MG/DL (ref 0.51–0.95)
CREAT UR-MCNC: 151.6 MG/DL
DEPRECATED HCO3 PLAS-SCNC: 22 MMOL/L (ref 22–29)
EGFRCR SERPLBLD CKD-EPI 2021: >90 ML/MIN/1.73M2
ERYTHROCYTE [DISTWIDTH] IN BLOOD BY AUTOMATED COUNT: 12.8 % (ref 10–15)
GLUCOSE SERPL-MCNC: 111 MG/DL (ref 70–99)
HCT VFR BLD AUTO: 33.8 % (ref 35–47)
HGB BLD-MCNC: 11.2 G/DL (ref 11.7–15.7)
MCH RBC QN AUTO: 28.7 PG (ref 26.5–33)
MCHC RBC AUTO-ENTMCNC: 33.1 G/DL (ref 31.5–36.5)
MCV RBC AUTO: 87 FL (ref 78–100)
PLATELET # BLD AUTO: 320 10E3/UL (ref 150–450)
POTASSIUM SERPL-SCNC: 3.7 MMOL/L (ref 3.4–5.3)
PROT SERPL-MCNC: 6.2 G/DL (ref 6.4–8.3)
PROT/CREAT 24H UR: 0.13 MG/MG CR (ref 0–0.2)
RBC # BLD AUTO: 3.9 10E6/UL (ref 3.8–5.2)
SODIUM SERPL-SCNC: 134 MMOL/L (ref 135–145)
SPECIMEN EXPIRATION DATE: NORMAL
WBC # BLD AUTO: 10.7 10E3/UL (ref 4–11)

## 2024-02-29 PROCEDURE — 85027 COMPLETE CBC AUTOMATED: CPT | Performed by: STUDENT IN AN ORGANIZED HEALTH CARE EDUCATION/TRAINING PROGRAM

## 2024-02-29 PROCEDURE — 120N000001 HC R&B MED SURG/OB

## 2024-02-29 PROCEDURE — 84156 ASSAY OF PROTEIN URINE: CPT | Performed by: STUDENT IN AN ORGANIZED HEALTH CARE EDUCATION/TRAINING PROGRAM

## 2024-02-29 PROCEDURE — 86780 TREPONEMA PALLIDUM: CPT | Performed by: STUDENT IN AN ORGANIZED HEALTH CARE EDUCATION/TRAINING PROGRAM

## 2024-02-29 PROCEDURE — 80053 COMPREHEN METABOLIC PANEL: CPT | Performed by: STUDENT IN AN ORGANIZED HEALTH CARE EDUCATION/TRAINING PROGRAM

## 2024-02-29 PROCEDURE — 250N000013 HC RX MED GY IP 250 OP 250 PS 637: Performed by: STUDENT IN AN ORGANIZED HEALTH CARE EDUCATION/TRAINING PROGRAM

## 2024-02-29 PROCEDURE — 86900 BLOOD TYPING SEROLOGIC ABO: CPT | Performed by: STUDENT IN AN ORGANIZED HEALTH CARE EDUCATION/TRAINING PROGRAM

## 2024-02-29 RX ORDER — KETOROLAC TROMETHAMINE 30 MG/ML
30 INJECTION, SOLUTION INTRAMUSCULAR; INTRAVENOUS
Status: DISCONTINUED | OUTPATIENT
Start: 2024-02-29 | End: 2024-03-02

## 2024-02-29 RX ORDER — LOPERAMIDE HCL 2 MG
4 CAPSULE ORAL
Status: DISCONTINUED | OUTPATIENT
Start: 2024-02-29 | End: 2024-03-02

## 2024-02-29 RX ORDER — MISOPROSTOL 200 UG/1
800 TABLET ORAL
Status: DISCONTINUED | OUTPATIENT
Start: 2024-02-29 | End: 2024-03-02

## 2024-02-29 RX ORDER — PROCHLORPERAZINE 25 MG
25 SUPPOSITORY, RECTAL RECTAL EVERY 12 HOURS PRN
Status: DISCONTINUED | OUTPATIENT
Start: 2024-02-29 | End: 2024-03-02

## 2024-02-29 RX ORDER — LABETALOL HYDROCHLORIDE 5 MG/ML
20-80 INJECTION, SOLUTION INTRAVENOUS EVERY 10 MIN PRN
Status: DISCONTINUED | OUTPATIENT
Start: 2024-02-29 | End: 2024-03-02

## 2024-02-29 RX ORDER — PROCHLORPERAZINE MALEATE 10 MG
10 TABLET ORAL EVERY 6 HOURS PRN
Status: DISCONTINUED | OUTPATIENT
Start: 2024-02-29 | End: 2024-03-02

## 2024-02-29 RX ORDER — NALOXONE HYDROCHLORIDE 0.4 MG/ML
0.2 INJECTION, SOLUTION INTRAMUSCULAR; INTRAVENOUS; SUBCUTANEOUS
Status: DISCONTINUED | OUTPATIENT
Start: 2024-02-29 | End: 2024-03-02

## 2024-02-29 RX ORDER — CARBOPROST TROMETHAMINE 250 UG/ML
250 INJECTION, SOLUTION INTRAMUSCULAR
Status: DISCONTINUED | OUTPATIENT
Start: 2024-02-29 | End: 2024-03-02

## 2024-02-29 RX ORDER — LIDOCAINE 40 MG/G
CREAM TOPICAL
Status: DISCONTINUED | OUTPATIENT
Start: 2024-02-29 | End: 2024-03-02

## 2024-02-29 RX ORDER — NALOXONE HYDROCHLORIDE 0.4 MG/ML
0.4 INJECTION, SOLUTION INTRAMUSCULAR; INTRAVENOUS; SUBCUTANEOUS
Status: DISCONTINUED | OUTPATIENT
Start: 2024-02-29 | End: 2024-03-02

## 2024-02-29 RX ORDER — SODIUM CHLORIDE, SODIUM LACTATE, POTASSIUM CHLORIDE, CALCIUM CHLORIDE 600; 310; 30; 20 MG/100ML; MG/100ML; MG/100ML; MG/100ML
INJECTION, SOLUTION INTRAVENOUS CONTINUOUS
Status: DISCONTINUED | OUTPATIENT
Start: 2024-02-29 | End: 2024-03-02

## 2024-02-29 RX ORDER — METOCLOPRAMIDE HYDROCHLORIDE 5 MG/ML
10 INJECTION INTRAMUSCULAR; INTRAVENOUS EVERY 6 HOURS PRN
Status: DISCONTINUED | OUTPATIENT
Start: 2024-02-29 | End: 2024-03-02

## 2024-02-29 RX ORDER — OXYTOCIN 10 [USP'U]/ML
10 INJECTION, SOLUTION INTRAMUSCULAR; INTRAVENOUS
Status: DISCONTINUED | OUTPATIENT
Start: 2024-02-29 | End: 2024-03-02

## 2024-02-29 RX ORDER — ONDANSETRON 2 MG/ML
4 INJECTION INTRAMUSCULAR; INTRAVENOUS EVERY 6 HOURS PRN
Status: DISCONTINUED | OUTPATIENT
Start: 2024-02-29 | End: 2024-03-02

## 2024-02-29 RX ORDER — OXYTOCIN/0.9 % SODIUM CHLORIDE 30/500 ML
100-340 PLASTIC BAG, INJECTION (ML) INTRAVENOUS CONTINUOUS PRN
Status: DISCONTINUED | OUTPATIENT
Start: 2024-02-29 | End: 2024-03-02

## 2024-02-29 RX ORDER — FENTANYL CITRATE 50 UG/ML
100 INJECTION, SOLUTION INTRAMUSCULAR; INTRAVENOUS
Status: DISCONTINUED | OUTPATIENT
Start: 2024-02-29 | End: 2024-03-02

## 2024-02-29 RX ORDER — TRANEXAMIC ACID 10 MG/ML
1 INJECTION, SOLUTION INTRAVENOUS EVERY 30 MIN PRN
Status: DISCONTINUED | OUTPATIENT
Start: 2024-02-29 | End: 2024-03-02

## 2024-02-29 RX ORDER — METOCLOPRAMIDE 10 MG/1
10 TABLET ORAL EVERY 6 HOURS PRN
Status: DISCONTINUED | OUTPATIENT
Start: 2024-02-29 | End: 2024-03-02

## 2024-02-29 RX ORDER — CITRIC ACID/SODIUM CITRATE 334-500MG
30 SOLUTION, ORAL ORAL
Status: DISCONTINUED | OUTPATIENT
Start: 2024-02-29 | End: 2024-03-02

## 2024-02-29 RX ORDER — ACETAMINOPHEN 325 MG/1
650 TABLET ORAL EVERY 4 HOURS PRN
Status: DISCONTINUED | OUTPATIENT
Start: 2024-02-29 | End: 2024-03-02

## 2024-02-29 RX ORDER — METHYLERGONOVINE MALEATE 0.2 MG/ML
200 INJECTION INTRAVENOUS
Status: DISCONTINUED | OUTPATIENT
Start: 2024-02-29 | End: 2024-03-02

## 2024-02-29 RX ORDER — MISOPROSTOL 100 UG/1
25 TABLET ORAL
Status: COMPLETED | OUTPATIENT
Start: 2024-02-29 | End: 2024-03-01

## 2024-02-29 RX ORDER — IBUPROFEN 800 MG/1
800 TABLET, FILM COATED ORAL
Status: DISCONTINUED | OUTPATIENT
Start: 2024-02-29 | End: 2024-03-02

## 2024-02-29 RX ORDER — HYDRALAZINE HYDROCHLORIDE 20 MG/ML
10 INJECTION INTRAMUSCULAR; INTRAVENOUS
Status: DISCONTINUED | OUTPATIENT
Start: 2024-02-29 | End: 2024-03-02

## 2024-02-29 RX ORDER — NIFEDIPINE 30 MG/1
60 TABLET, EXTENDED RELEASE ORAL DAILY
Status: DISCONTINUED | OUTPATIENT
Start: 2024-02-29 | End: 2024-03-02

## 2024-02-29 RX ORDER — ONDANSETRON 4 MG/1
4 TABLET, ORALLY DISINTEGRATING ORAL EVERY 6 HOURS PRN
Status: DISCONTINUED | OUTPATIENT
Start: 2024-02-29 | End: 2024-03-02

## 2024-02-29 RX ORDER — HYDROXYZINE HYDROCHLORIDE 50 MG/1
50 TABLET, FILM COATED ORAL
Status: DISCONTINUED | OUTPATIENT
Start: 2024-02-29 | End: 2024-03-02

## 2024-02-29 RX ORDER — MISOPROSTOL 200 UG/1
400 TABLET ORAL
Status: DISCONTINUED | OUTPATIENT
Start: 2024-02-29 | End: 2024-03-02

## 2024-02-29 RX ORDER — OXYTOCIN/0.9 % SODIUM CHLORIDE 30/500 ML
340 PLASTIC BAG, INJECTION (ML) INTRAVENOUS CONTINUOUS PRN
Status: DISCONTINUED | OUTPATIENT
Start: 2024-02-29 | End: 2024-03-02

## 2024-02-29 RX ORDER — LOPERAMIDE HCL 2 MG
2 CAPSULE ORAL
Status: DISCONTINUED | OUTPATIENT
Start: 2024-02-29 | End: 2024-03-02

## 2024-02-29 RX ADMIN — Medication 25 MCG: at 21:54

## 2024-02-29 RX ADMIN — HYDROXYZINE HYDROCHLORIDE 50 MG: 50 TABLET, FILM COATED ORAL at 21:54

## 2024-02-29 RX ADMIN — SERTRALINE HYDROCHLORIDE 100 MG: 50 TABLET ORAL at 21:54

## 2024-02-29 RX ADMIN — NIFEDIPINE 60 MG: 30 TABLET, FILM COATED, EXTENDED RELEASE ORAL at 21:54

## 2024-02-29 RX ADMIN — Medication 25 MCG: at 23:59

## 2024-02-29 ASSESSMENT — ACTIVITIES OF DAILY LIVING (ADL)
ADLS_ACUITY_SCORE: 18

## 2024-03-01 LAB — T PALLIDUM AB SER QL: NONREACTIVE

## 2024-03-01 PROCEDURE — 250N000013 HC RX MED GY IP 250 OP 250 PS 637: Performed by: OBSTETRICS & GYNECOLOGY

## 2024-03-01 PROCEDURE — 250N000013 HC RX MED GY IP 250 OP 250 PS 637: Performed by: STUDENT IN AN ORGANIZED HEALTH CARE EDUCATION/TRAINING PROGRAM

## 2024-03-01 PROCEDURE — 120N000001 HC R&B MED SURG/OB

## 2024-03-01 RX ORDER — LABETALOL 100 MG/1
100 TABLET, FILM COATED ORAL EVERY 12 HOURS SCHEDULED
Status: DISCONTINUED | OUTPATIENT
Start: 2024-03-01 | End: 2024-03-02

## 2024-03-01 RX ADMIN — Medication 25 MCG: at 04:02

## 2024-03-01 RX ADMIN — ACETAMINOPHEN 650 MG: 325 TABLET, FILM COATED ORAL at 08:11

## 2024-03-01 RX ADMIN — Medication 25 MCG: at 12:40

## 2024-03-01 RX ADMIN — Medication 25 MCG: at 20:52

## 2024-03-01 RX ADMIN — NIFEDIPINE 60 MG: 30 TABLET, FILM COATED, EXTENDED RELEASE ORAL at 20:00

## 2024-03-01 RX ADMIN — LABETALOL HYDROCHLORIDE 100 MG: 100 TABLET, FILM COATED ORAL at 19:59

## 2024-03-01 RX ADMIN — Medication 25 MCG: at 01:57

## 2024-03-01 RX ADMIN — Medication 25 MCG: at 16:40

## 2024-03-01 RX ADMIN — ACETAMINOPHEN 650 MG: 325 TABLET, FILM COATED ORAL at 17:55

## 2024-03-01 RX ADMIN — HYDROXYZINE HYDROCHLORIDE 50 MG: 50 TABLET, FILM COATED ORAL at 00:02

## 2024-03-01 RX ADMIN — HYDROXYZINE HYDROCHLORIDE 50 MG: 50 TABLET, FILM COATED ORAL at 23:24

## 2024-03-01 RX ADMIN — Medication 25 MCG: at 14:42

## 2024-03-01 RX ADMIN — Medication 25 MCG: at 06:04

## 2024-03-01 RX ADMIN — LABETALOL HYDROCHLORIDE 100 MG: 100 TABLET, FILM COATED ORAL at 08:10

## 2024-03-01 RX ADMIN — Medication 25 MCG: at 18:51

## 2024-03-01 RX ADMIN — Medication 25 MCG: at 08:10

## 2024-03-01 RX ADMIN — SERTRALINE HYDROCHLORIDE 100 MG: 50 TABLET ORAL at 20:00

## 2024-03-01 RX ADMIN — Medication 25 MCG: at 10:39

## 2024-03-01 ASSESSMENT — ACTIVITIES OF DAILY LIVING (ADL)
ADLS_ACUITY_SCORE: 18

## 2024-03-01 NOTE — PROGRESS NOTES
Feeling crampy.  /60   Temp 97.5  F (36.4  C) (Oral)   Resp 18    FHT 130s, mod variability, no decels, +accels  SVE FT/60/-4 posterior    A/P: 28 year old  @ 37w1d for IOL due to pre-eclampsia  - FWB Category 1  - Continue cervical ripening with oral cytotec  - Discussed cervidil and cervical ripening balloon    Shavon Roberto MD

## 2024-03-01 NOTE — H&P
Labor and Delivery Admission Note  Chief Complaint: mIOL     History of Present Illness: Laura Singh is a 28 year old  at 37w0d who presents for IOL secondary to pre-eclampsia.    Vital Signs: BP (!) 145/97 (BP Location: Left arm, Patient Position: Semi-Garcia's, Cuff Size: Adult Large)   Temp 97.4  F (36.3  C) (Oral)   Resp 18      Fetal Presentation: Vertex by BSUS  Cervical Exam:  c/th/-4  Fetal Assessment: moderate variablility, + accels, no decels, Category I    Assessment and Plan:  Laura Singh is a 28 year old  being admitted to L&D for medical induction of labor    # mIOL  -- starting SVE cl//hi, membranes intact, vertex by BSUS   -- cervical ripening overnight with misoprostol PO overnight    # Pre-eclampsia without severe features  -- On nifedipine 60 mg at night  -- PIH labs pending, patient asymptomatic  -- BP mild range     # Depression  [facility]    Florinda De La Rosa MD  2024, 10:06 PM

## 2024-03-01 NOTE — PROGRESS NOTES
In House OB Note    Asked by Dr. De La Rosa to perform bedside ultrasound to confirm presentation prior to IOL as SVE is closed and fetal presenting part not palpated on exam. BSUS completed, baby is noted to be in cephalic position, unengaged/high consistent with SVE.    Elida Beatty MD, FACOG  [facility]  2/29/2024 8:41 PM

## 2024-03-01 NOTE — PROVIDER NOTIFICATION
03/01/24 0753   Provider Notification   Provider Name/Title Dr. Martinez   Method of Notification At Bedside     Dr. Martinez at bedside. Updated MD that pt is very difficult to monitor with EFM and also having difficulty with Jennifer. Per night shift RN, FHT's were cat I when she was able to trace. Pt having irregular ctx's after 6 doses of PO Cytotec, palpating mild. Pt states she is not feeling any contractions or cramping. Fetus is active per pt. No vag bldg. Abd palpates soft. Updated on mild-moderate range BP's since admission. No s/s of PreE noted. At this time, RN has been unable to determine baseline fetal heart rate.     MD orders to continue with PO Cytotec at this time despite inability to monitor fetus easily. MD orders to add PO Labetalol 100 mg BID in addition to her already scheduled Nifedipine. Pt and SO updated and agreeable on plan going forward. Anticipatory guidance given and questions answered.

## 2024-03-01 NOTE — PROVIDER NOTIFICATION
03/01/24 1658   Provider Notification   Provider Name/Title Dr. CHRISTI Roberto   Method of Notification At Bedside   Request Evaluate in Person   Notification Reason SVE;Status Update     MD at bedside to evaluate patient. Patient has received 10 doses of oral cytotec at this point. Feeling crampy. MD performed SVE. Patient 0/60/-4 giving a stock of 4. Per MD, continue with PO cytotec for full 12 doses, then continue on to cervidil. Patient agreeable with plan of care.

## 2024-03-01 NOTE — PROVIDER NOTIFICATION
02/29/24 2036   Provider Notification   Provider Name/Title Dr. Beatty   Method of Notification At Bedside   Request Evaluate in Person   Notification Reason Other (Comment)  (BSUS)     Dr. Beatty at bedside. BSUS confirmed vertex presentation.

## 2024-03-01 NOTE — PROVIDER NOTIFICATION
02/29/24 2014   Provider Notification   Provider Name/Title Dr. De La Rosa   Method of Notification Phone   Request Evaluate - Remote   Notification Reason Patient Arrived     Dr. De La Rosa notified of patient arrival. FHR with moderate variability and accels, no decels noted. Pt. Denies contractions. Unable to reach cervix with SVE. Denies s/s pre-eclampsia, admission /97.     TORB for standard intrapartum orders with CMP and urine protein, P.O. Cytotec for cervical ripening, and labetalol hypertensive order set. Okay to re-order daily Nifedipine 60mg and Zoloft 100mg. Provider to speak to in house Dr. Beatty for BSUS to confirm vertex presentation.

## 2024-03-01 NOTE — PROGRESS NOTES
Data: [facility]: 2024  7:24 PM.  Patient admitted for induction for pre-eclampsia. Patient is a .  Prenatal record reviewed. Pregnancy  has been complicated by preeclampsia.  Gestational Age 37w0d. VSS. Fetal movement active. Patient denies uterine contractions, leaking of vaginal fluid/rupture of membranes, vaginal bleeding, abdominal pain, pelvic pressure, nausea, vomiting, headache, visual disturbances, epigastric or URQ pain, significant edema. Support person is present.   Action: Verbal consent for EFM.  Admission assessment completed. Bill of rights reviewed.  Response: Patient verbalized agreement with plan. Will contact Dr Florinda De La Rosa with update and further orders.

## 2024-03-01 NOTE — PROGRESS NOTES
Patient feels minimal cramping. Good FM.     /77 (BP Location: Left arm, Patient Position: Semi-Garcia's, Cuff Size: Adult Large)   Temp 97.6  F (36.4  C) (Oral)   Resp 18   Gen--NAD  Abd--Gravid, NTTP    Continue oral misoprostol.     Sena Martinez MD

## 2024-03-02 ENCOUNTER — ANESTHESIA EVENT (OUTPATIENT)
Dept: SURGERY | Facility: CLINIC | Age: 29
End: 2024-03-02
Payer: COMMERCIAL

## 2024-03-02 ENCOUNTER — ANESTHESIA (OUTPATIENT)
Dept: SURGERY | Facility: CLINIC | Age: 29
End: 2024-03-02
Payer: COMMERCIAL

## 2024-03-02 LAB
CREAT SERPL-MCNC: 0.65 MG/DL (ref 0.51–0.95)
EGFRCR SERPLBLD CKD-EPI 2021: >90 ML/MIN/1.73M2

## 2024-03-02 PROCEDURE — 82565 ASSAY OF CREATININE: CPT | Performed by: OBSTETRICS & GYNECOLOGY

## 2024-03-02 PROCEDURE — 258N000003 HC RX IP 258 OP 636: Performed by: STUDENT IN AN ORGANIZED HEALTH CARE EDUCATION/TRAINING PROGRAM

## 2024-03-02 PROCEDURE — 250N000013 HC RX MED GY IP 250 OP 250 PS 637: Performed by: OBSTETRICS & GYNECOLOGY

## 2024-03-02 PROCEDURE — 258N000003 HC RX IP 258 OP 636: Performed by: OBSTETRICS & GYNECOLOGY

## 2024-03-02 PROCEDURE — 120N000001 HC R&B MED SURG/OB

## 2024-03-02 PROCEDURE — 36415 COLL VENOUS BLD VENIPUNCTURE: CPT | Performed by: OBSTETRICS & GYNECOLOGY

## 2024-03-02 PROCEDURE — 272N000001 HC OR GENERAL SUPPLY STERILE: Performed by: OBSTETRICS & GYNECOLOGY

## 2024-03-02 PROCEDURE — 370N000017 HC ANESTHESIA TECHNICAL FEE, PER MIN: Performed by: OBSTETRICS & GYNECOLOGY

## 2024-03-02 PROCEDURE — 250N000011 HC RX IP 250 OP 636: Performed by: OBSTETRICS & GYNECOLOGY

## 2024-03-02 PROCEDURE — 250N000009 HC RX 250: Performed by: NURSE ANESTHETIST, CERTIFIED REGISTERED

## 2024-03-02 PROCEDURE — 88307 TISSUE EXAM BY PATHOLOGIST: CPT | Mod: TC | Performed by: OBSTETRICS & GYNECOLOGY

## 2024-03-02 PROCEDURE — 258N000003 HC RX IP 258 OP 636: Performed by: NURSE ANESTHETIST, CERTIFIED REGISTERED

## 2024-03-02 PROCEDURE — 250N000011 HC RX IP 250 OP 636: Performed by: NURSE ANESTHETIST, CERTIFIED REGISTERED

## 2024-03-02 PROCEDURE — 360N000076 HC SURGERY LEVEL 3, PER MIN: Performed by: OBSTETRICS & GYNECOLOGY

## 2024-03-02 PROCEDURE — 88307 TISSUE EXAM BY PATHOLOGIST: CPT | Mod: 26 | Performed by: PATHOLOGY

## 2024-03-02 PROCEDURE — 250N000011 HC RX IP 250 OP 636: Performed by: ANESTHESIOLOGY

## 2024-03-02 PROCEDURE — 710N000010 HC RECOVERY PHASE 1, LEVEL 2, PER MIN: Performed by: OBSTETRICS & GYNECOLOGY

## 2024-03-02 RX ORDER — LOPERAMIDE HCL 2 MG
4 CAPSULE ORAL
Status: DISCONTINUED | OUTPATIENT
Start: 2024-03-02 | End: 2024-03-06 | Stop reason: HOSPADM

## 2024-03-02 RX ORDER — OXYTOCIN/0.9 % SODIUM CHLORIDE 30/500 ML
PLASTIC BAG, INJECTION (ML) INTRAVENOUS PRN
Status: DISCONTINUED | OUTPATIENT
Start: 2024-03-02 | End: 2024-03-02

## 2024-03-02 RX ORDER — ONDANSETRON 2 MG/ML
INJECTION INTRAMUSCULAR; INTRAVENOUS PRN
Status: DISCONTINUED | OUTPATIENT
Start: 2024-03-02 | End: 2024-03-02

## 2024-03-02 RX ORDER — MODIFIED LANOLIN
OINTMENT (GRAM) TOPICAL
Status: DISCONTINUED | OUTPATIENT
Start: 2024-03-02 | End: 2024-03-06 | Stop reason: HOSPADM

## 2024-03-02 RX ORDER — BUPIVACAINE HYDROCHLORIDE 7.5 MG/ML
INJECTION, SOLUTION INTRASPINAL
Status: COMPLETED | OUTPATIENT
Start: 2024-03-02 | End: 2024-03-02

## 2024-03-02 RX ORDER — OXYTOCIN/0.9 % SODIUM CHLORIDE 30/500 ML
340 PLASTIC BAG, INJECTION (ML) INTRAVENOUS CONTINUOUS PRN
Status: DISCONTINUED | OUTPATIENT
Start: 2024-03-02 | End: 2024-03-02 | Stop reason: HOSPADM

## 2024-03-02 RX ORDER — ONDANSETRON 2 MG/ML
4 INJECTION INTRAMUSCULAR; INTRAVENOUS EVERY 6 HOURS PRN
Status: DISCONTINUED | OUTPATIENT
Start: 2024-03-02 | End: 2024-03-06 | Stop reason: HOSPADM

## 2024-03-02 RX ORDER — LIDOCAINE 40 MG/G
CREAM TOPICAL
Status: DISCONTINUED | OUTPATIENT
Start: 2024-03-02 | End: 2024-03-02 | Stop reason: HOSPADM

## 2024-03-02 RX ORDER — FENTANYL CITRATE-0.9 % NACL/PF 10 MCG/ML
100 PLASTIC BAG, INJECTION (ML) INTRAVENOUS EVERY 5 MIN PRN
Status: DISCONTINUED | OUTPATIENT
Start: 2024-03-02 | End: 2024-03-02

## 2024-03-02 RX ORDER — OXYTOCIN 10 [USP'U]/ML
10 INJECTION, SOLUTION INTRAMUSCULAR; INTRAVENOUS
Status: DISCONTINUED | OUTPATIENT
Start: 2024-03-02 | End: 2024-03-06 | Stop reason: HOSPADM

## 2024-03-02 RX ORDER — OXYTOCIN/0.9 % SODIUM CHLORIDE 30/500 ML
340 PLASTIC BAG, INJECTION (ML) INTRAVENOUS CONTINUOUS PRN
Status: DISCONTINUED | OUTPATIENT
Start: 2024-03-02 | End: 2024-03-06 | Stop reason: HOSPADM

## 2024-03-02 RX ORDER — ACETAMINOPHEN 325 MG/1
975 TABLET ORAL ONCE
Status: COMPLETED | OUTPATIENT
Start: 2024-03-02 | End: 2024-03-02

## 2024-03-02 RX ORDER — PROPOFOL 10 MG/ML
INJECTION, EMULSION INTRAVENOUS PRN
Status: DISCONTINUED | OUTPATIENT
Start: 2024-03-02 | End: 2024-03-02

## 2024-03-02 RX ORDER — PROCHLORPERAZINE 25 MG
25 SUPPOSITORY, RECTAL RECTAL EVERY 12 HOURS PRN
Status: DISCONTINUED | OUTPATIENT
Start: 2024-03-02 | End: 2024-03-06 | Stop reason: HOSPADM

## 2024-03-02 RX ORDER — LOPERAMIDE HCL 2 MG
2 CAPSULE ORAL
Status: DISCONTINUED | OUTPATIENT
Start: 2024-03-02 | End: 2024-03-02 | Stop reason: HOSPADM

## 2024-03-02 RX ORDER — SIMETHICONE 80 MG
80 TABLET,CHEWABLE ORAL 4 TIMES DAILY PRN
Status: DISCONTINUED | OUTPATIENT
Start: 2024-03-02 | End: 2024-03-06 | Stop reason: HOSPADM

## 2024-03-02 RX ORDER — CEFAZOLIN SODIUM/WATER 3 G/30 ML
3 SYRINGE (ML) INTRAVENOUS SEE ADMIN INSTRUCTIONS
Status: DISCONTINUED | OUTPATIENT
Start: 2024-03-02 | End: 2024-03-02 | Stop reason: HOSPADM

## 2024-03-02 RX ORDER — CARBOPROST TROMETHAMINE 250 UG/ML
250 INJECTION, SOLUTION INTRAMUSCULAR
Status: DISCONTINUED | OUTPATIENT
Start: 2024-03-02 | End: 2024-03-06 | Stop reason: HOSPADM

## 2024-03-02 RX ORDER — OXYTOCIN/0.9 % SODIUM CHLORIDE 30/500 ML
100-340 PLASTIC BAG, INJECTION (ML) INTRAVENOUS CONTINUOUS PRN
Status: DISCONTINUED | OUTPATIENT
Start: 2024-03-02 | End: 2024-03-06 | Stop reason: HOSPADM

## 2024-03-02 RX ORDER — NALBUPHINE HYDROCHLORIDE 10 MG/ML
2.5-5 INJECTION, SOLUTION INTRAMUSCULAR; INTRAVENOUS; SUBCUTANEOUS EVERY 6 HOURS PRN
Status: DISCONTINUED | OUTPATIENT
Start: 2024-03-02 | End: 2024-03-02

## 2024-03-02 RX ORDER — CARBOPROST TROMETHAMINE 250 UG/ML
250 INJECTION, SOLUTION INTRAMUSCULAR
Status: DISCONTINUED | OUTPATIENT
Start: 2024-03-02 | End: 2024-03-02 | Stop reason: HOSPADM

## 2024-03-02 RX ORDER — NALOXONE HYDROCHLORIDE 0.4 MG/ML
0.2 INJECTION, SOLUTION INTRAMUSCULAR; INTRAVENOUS; SUBCUTANEOUS
Status: DISCONTINUED | OUTPATIENT
Start: 2024-03-02 | End: 2024-03-06 | Stop reason: HOSPADM

## 2024-03-02 RX ORDER — ONDANSETRON 4 MG/1
4 TABLET, ORALLY DISINTEGRATING ORAL EVERY 6 HOURS PRN
Status: DISCONTINUED | OUTPATIENT
Start: 2024-03-02 | End: 2024-03-06 | Stop reason: HOSPADM

## 2024-03-02 RX ORDER — MORPHINE SULFATE 1 MG/ML
INJECTION, SOLUTION EPIDURAL; INTRATHECAL; INTRAVENOUS
Status: COMPLETED | OUTPATIENT
Start: 2024-03-02 | End: 2024-03-02

## 2024-03-02 RX ORDER — LOPERAMIDE HCL 2 MG
2 CAPSULE ORAL
Status: DISCONTINUED | OUTPATIENT
Start: 2024-03-02 | End: 2024-03-06 | Stop reason: HOSPADM

## 2024-03-02 RX ORDER — AMOXICILLIN 250 MG
1 CAPSULE ORAL 2 TIMES DAILY
Status: DISCONTINUED | OUTPATIENT
Start: 2024-03-02 | End: 2024-03-06 | Stop reason: HOSPADM

## 2024-03-02 RX ORDER — DEXTROSE, SODIUM CHLORIDE, SODIUM LACTATE, POTASSIUM CHLORIDE, AND CALCIUM CHLORIDE 5; .6; .31; .03; .02 G/100ML; G/100ML; G/100ML; G/100ML; G/100ML
INJECTION, SOLUTION INTRAVENOUS CONTINUOUS
Status: DISCONTINUED | OUTPATIENT
Start: 2024-03-02 | End: 2024-03-06 | Stop reason: HOSPADM

## 2024-03-02 RX ORDER — CEFAZOLIN SODIUM 1 G/3ML
INJECTION, POWDER, FOR SOLUTION INTRAMUSCULAR; INTRAVENOUS PRN
Status: DISCONTINUED | OUTPATIENT
Start: 2024-03-02 | End: 2024-03-02

## 2024-03-02 RX ORDER — PROCHLORPERAZINE MALEATE 10 MG
10 TABLET ORAL EVERY 6 HOURS PRN
Status: DISCONTINUED | OUTPATIENT
Start: 2024-03-02 | End: 2024-03-06 | Stop reason: HOSPADM

## 2024-03-02 RX ORDER — LIDOCAINE 40 MG/G
CREAM TOPICAL
Status: DISCONTINUED | OUTPATIENT
Start: 2024-03-02 | End: 2024-03-06 | Stop reason: HOSPADM

## 2024-03-02 RX ORDER — AMOXICILLIN 250 MG
2 CAPSULE ORAL 2 TIMES DAILY
Status: DISCONTINUED | OUTPATIENT
Start: 2024-03-02 | End: 2024-03-06 | Stop reason: HOSPADM

## 2024-03-02 RX ORDER — ENOXAPARIN SODIUM 100 MG/ML
40 INJECTION SUBCUTANEOUS EVERY 12 HOURS
Status: DISCONTINUED | OUTPATIENT
Start: 2024-03-02 | End: 2024-03-06 | Stop reason: HOSPADM

## 2024-03-02 RX ORDER — NIFEDIPINE 30 MG/1
30 TABLET, EXTENDED RELEASE ORAL DAILY
Status: DISCONTINUED | OUTPATIENT
Start: 2024-03-02 | End: 2024-03-05

## 2024-03-02 RX ORDER — TRANEXAMIC ACID 10 MG/ML
1 INJECTION, SOLUTION INTRAVENOUS EVERY 30 MIN PRN
Status: DISCONTINUED | OUTPATIENT
Start: 2024-03-02 | End: 2024-03-06 | Stop reason: HOSPADM

## 2024-03-02 RX ORDER — LOPERAMIDE HCL 2 MG
4 CAPSULE ORAL
Status: DISCONTINUED | OUTPATIENT
Start: 2024-03-02 | End: 2024-03-02 | Stop reason: HOSPADM

## 2024-03-02 RX ORDER — METHYLERGONOVINE MALEATE 0.2 MG/ML
200 INJECTION INTRAVENOUS
Status: DISCONTINUED | OUTPATIENT
Start: 2024-03-02 | End: 2024-03-02 | Stop reason: HOSPADM

## 2024-03-02 RX ORDER — FENTANYL CITRATE 50 UG/ML
INJECTION, SOLUTION INTRAMUSCULAR; INTRAVENOUS
Status: COMPLETED | OUTPATIENT
Start: 2024-03-02 | End: 2024-03-02

## 2024-03-02 RX ORDER — BISACODYL 10 MG
10 SUPPOSITORY, RECTAL RECTAL DAILY PRN
Status: DISCONTINUED | OUTPATIENT
Start: 2024-03-04 | End: 2024-03-06 | Stop reason: HOSPADM

## 2024-03-02 RX ORDER — TRANEXAMIC ACID 10 MG/ML
1 INJECTION, SOLUTION INTRAVENOUS EVERY 30 MIN PRN
Status: DISCONTINUED | OUTPATIENT
Start: 2024-03-02 | End: 2024-03-02 | Stop reason: HOSPADM

## 2024-03-02 RX ORDER — MISOPROSTOL 200 UG/1
800 TABLET ORAL
Status: DISCONTINUED | OUTPATIENT
Start: 2024-03-02 | End: 2024-03-06 | Stop reason: HOSPADM

## 2024-03-02 RX ORDER — METOCLOPRAMIDE HYDROCHLORIDE 5 MG/ML
10 INJECTION INTRAMUSCULAR; INTRAVENOUS EVERY 6 HOURS PRN
Status: DISCONTINUED | OUTPATIENT
Start: 2024-03-02 | End: 2024-03-06 | Stop reason: HOSPADM

## 2024-03-02 RX ORDER — CITRIC ACID/SODIUM CITRATE 334-500MG
30 SOLUTION, ORAL ORAL
Status: COMPLETED | OUTPATIENT
Start: 2024-03-02 | End: 2024-03-02

## 2024-03-02 RX ORDER — METOCLOPRAMIDE 10 MG/1
10 TABLET ORAL EVERY 6 HOURS PRN
Status: DISCONTINUED | OUTPATIENT
Start: 2024-03-02 | End: 2024-03-06 | Stop reason: HOSPADM

## 2024-03-02 RX ORDER — SODIUM CHLORIDE, SODIUM LACTATE, POTASSIUM CHLORIDE, CALCIUM CHLORIDE 600; 310; 30; 20 MG/100ML; MG/100ML; MG/100ML; MG/100ML
INJECTION, SOLUTION INTRAVENOUS CONTINUOUS
Status: DISCONTINUED | OUTPATIENT
Start: 2024-03-02 | End: 2024-03-02 | Stop reason: HOSPADM

## 2024-03-02 RX ORDER — METHYLERGONOVINE MALEATE 0.2 MG/ML
200 INJECTION INTRAVENOUS
Status: DISCONTINUED | OUTPATIENT
Start: 2024-03-02 | End: 2024-03-06 | Stop reason: HOSPADM

## 2024-03-02 RX ORDER — OXYCODONE HYDROCHLORIDE 5 MG/1
5 TABLET ORAL EVERY 4 HOURS PRN
Status: DISCONTINUED | OUTPATIENT
Start: 2024-03-02 | End: 2024-03-06 | Stop reason: HOSPADM

## 2024-03-02 RX ORDER — OXYTOCIN 10 [USP'U]/ML
10 INJECTION, SOLUTION INTRAMUSCULAR; INTRAVENOUS
Status: DISCONTINUED | OUTPATIENT
Start: 2024-03-02 | End: 2024-03-02 | Stop reason: HOSPADM

## 2024-03-02 RX ORDER — IBUPROFEN 800 MG/1
800 TABLET, FILM COATED ORAL EVERY 6 HOURS
Status: DISCONTINUED | OUTPATIENT
Start: 2024-03-03 | End: 2024-03-06 | Stop reason: HOSPADM

## 2024-03-02 RX ORDER — MISOPROSTOL 200 UG/1
800 TABLET ORAL
Status: DISCONTINUED | OUTPATIENT
Start: 2024-03-02 | End: 2024-03-02 | Stop reason: HOSPADM

## 2024-03-02 RX ORDER — MISOPROSTOL 200 UG/1
400 TABLET ORAL
Status: DISCONTINUED | OUTPATIENT
Start: 2024-03-02 | End: 2024-03-06 | Stop reason: HOSPADM

## 2024-03-02 RX ORDER — MISOPROSTOL 200 UG/1
400 TABLET ORAL
Status: DISCONTINUED | OUTPATIENT
Start: 2024-03-02 | End: 2024-03-02 | Stop reason: HOSPADM

## 2024-03-02 RX ORDER — CEFAZOLIN SODIUM/WATER 3 G/30 ML
3 SYRINGE (ML) INTRAVENOUS
Status: DISCONTINUED | OUTPATIENT
Start: 2024-03-02 | End: 2024-03-02 | Stop reason: HOSPADM

## 2024-03-02 RX ORDER — DEXAMETHASONE SODIUM PHOSPHATE 4 MG/ML
INJECTION, SOLUTION INTRA-ARTICULAR; INTRALESIONAL; INTRAMUSCULAR; INTRAVENOUS; SOFT TISSUE PRN
Status: DISCONTINUED | OUTPATIENT
Start: 2024-03-02 | End: 2024-03-02

## 2024-03-02 RX ORDER — KETOROLAC TROMETHAMINE 30 MG/ML
30 INJECTION, SOLUTION INTRAMUSCULAR; INTRAVENOUS EVERY 6 HOURS
Qty: 3 ML | Refills: 0 | Status: COMPLETED | OUTPATIENT
Start: 2024-03-02 | End: 2024-03-03

## 2024-03-02 RX ORDER — NALOXONE HYDROCHLORIDE 0.4 MG/ML
0.4 INJECTION, SOLUTION INTRAMUSCULAR; INTRAVENOUS; SUBCUTANEOUS
Status: DISCONTINUED | OUTPATIENT
Start: 2024-03-02 | End: 2024-03-06 | Stop reason: HOSPADM

## 2024-03-02 RX ORDER — ACETAMINOPHEN 325 MG/1
975 TABLET ORAL EVERY 6 HOURS
Status: DISCONTINUED | OUTPATIENT
Start: 2024-03-02 | End: 2024-03-06 | Stop reason: HOSPADM

## 2024-03-02 RX ORDER — HYDROCORTISONE 25 MG/G
CREAM TOPICAL 3 TIMES DAILY PRN
Status: DISCONTINUED | OUTPATIENT
Start: 2024-03-02 | End: 2024-03-06 | Stop reason: HOSPADM

## 2024-03-02 RX ADMIN — ACETAMINOPHEN 975 MG: 325 TABLET, FILM COATED ORAL at 09:17

## 2024-03-02 RX ADMIN — ONDANSETRON 4 MG: 2 INJECTION INTRAMUSCULAR; INTRAVENOUS at 10:09

## 2024-03-02 RX ADMIN — ACETAMINOPHEN 975 MG: 325 TABLET, FILM COATED ORAL at 14:47

## 2024-03-02 RX ADMIN — KETOROLAC TROMETHAMINE 30 MG: 30 INJECTION, SOLUTION INTRAMUSCULAR; INTRAVENOUS at 15:54

## 2024-03-02 RX ADMIN — SODIUM CITRATE AND CITRIC ACID MONOHYDRATE 30 ML: 500; 334 SOLUTION ORAL at 09:17

## 2024-03-02 RX ADMIN — ACETAMINOPHEN 975 MG: 325 TABLET, FILM COATED ORAL at 20:55

## 2024-03-02 RX ADMIN — SODIUM CHLORIDE, SODIUM LACTATE, POTASSIUM CHLORIDE, CALCIUM CHLORIDE AND DEXTROSE MONOHYDRATE: 5; 600; 310; 30; 20 INJECTION, SOLUTION INTRAVENOUS at 12:31

## 2024-03-02 RX ADMIN — SODIUM CHLORIDE, POTASSIUM CHLORIDE, SODIUM LACTATE AND CALCIUM CHLORIDE 500 ML: 600; 310; 30; 20 INJECTION, SOLUTION INTRAVENOUS at 08:01

## 2024-03-02 RX ADMIN — PROPOFOL 10 MG: 10 INJECTION, EMULSION INTRAVENOUS at 10:27

## 2024-03-02 RX ADMIN — LABETALOL HYDROCHLORIDE 100 MG: 100 TABLET, FILM COATED ORAL at 07:53

## 2024-03-02 RX ADMIN — ENOXAPARIN SODIUM 40 MG: 40 INJECTION SUBCUTANEOUS at 23:45

## 2024-03-02 RX ADMIN — PHENYLEPHRINE HYDROCHLORIDE 35 MCG/MIN: 10 INJECTION INTRAVENOUS at 10:24

## 2024-03-02 RX ADMIN — FENTANYL CITRATE 15 MCG: 50 INJECTION INTRAMUSCULAR; INTRAVENOUS at 10:08

## 2024-03-02 RX ADMIN — BUPIVACAINE HYDROCHLORIDE IN DEXTROSE 1.6 ML: 7.5 INJECTION, SOLUTION SUBARACHNOID at 10:08

## 2024-03-02 RX ADMIN — SODIUM CHLORIDE, SODIUM LACTATE, POTASSIUM CHLORIDE, CALCIUM CHLORIDE AND DEXTROSE MONOHYDRATE: 5; 600; 310; 30; 20 INJECTION, SOLUTION INTRAVENOUS at 23:45

## 2024-03-02 RX ADMIN — DEXAMETHASONE SODIUM PHOSPHATE 4 MG: 4 INJECTION, SOLUTION INTRA-ARTICULAR; INTRALESIONAL; INTRAMUSCULAR; INTRAVENOUS; SOFT TISSUE at 10:27

## 2024-03-02 RX ADMIN — SENNOSIDES AND DOCUSATE SODIUM 1 TABLET: 50; 8.6 TABLET ORAL at 20:55

## 2024-03-02 RX ADMIN — SODIUM CHLORIDE, POTASSIUM CHLORIDE, SODIUM LACTATE AND CALCIUM CHLORIDE: 600; 310; 30; 20 INJECTION, SOLUTION INTRAVENOUS at 11:04

## 2024-03-02 RX ADMIN — CEFAZOLIN 3 G: 1 INJECTION, POWDER, FOR SOLUTION INTRAMUSCULAR; INTRAVENOUS at 10:04

## 2024-03-02 RX ADMIN — MORPHINE SULFATE 0.2 MG: 1 INJECTION EPIDURAL; INTRATHECAL; INTRAVENOUS at 10:08

## 2024-03-02 RX ADMIN — OXYTOCIN-SODIUM CHLORIDE 0.9% IV SOLN 30 UNIT/500ML 170 ML: 30-0.9/5 SOLUTION at 11:11

## 2024-03-02 RX ADMIN — OXYTOCIN-SODIUM CHLORIDE 0.9% IV SOLN 30 UNIT/500ML 30 ML: 30-0.9/5 SOLUTION at 10:45

## 2024-03-02 RX ADMIN — NIFEDIPINE 30 MG: 30 TABLET, FILM COATED, EXTENDED RELEASE ORAL at 20:55

## 2024-03-02 RX ADMIN — SERTRALINE HYDROCHLORIDE 100 MG: 50 TABLET ORAL at 20:55

## 2024-03-02 RX ADMIN — SODIUM CHLORIDE, POTASSIUM CHLORIDE, SODIUM LACTATE AND CALCIUM CHLORIDE: 600; 310; 30; 20 INJECTION, SOLUTION INTRAVENOUS at 10:04

## 2024-03-02 RX ADMIN — MIDAZOLAM 2 MG: 1 INJECTION INTRAMUSCULAR; INTRAVENOUS at 10:46

## 2024-03-02 RX ADMIN — KETOROLAC TROMETHAMINE 30 MG: 30 INJECTION, SOLUTION INTRAMUSCULAR; INTRAVENOUS at 22:14

## 2024-03-02 RX ADMIN — SODIUM CHLORIDE, POTASSIUM CHLORIDE, SODIUM LACTATE AND CALCIUM CHLORIDE 500 ML: 600; 310; 30; 20 INJECTION, SOLUTION INTRAVENOUS at 18:00

## 2024-03-02 ASSESSMENT — ACTIVITIES OF DAILY LIVING (ADL)
ADLS_ACUITY_SCORE: 18
ADLS_ACUITY_SCORE: 23
ADLS_ACUITY_SCORE: 18
ADLS_ACUITY_SCORE: 18
ADLS_ACUITY_SCORE: 23
ADLS_ACUITY_SCORE: 18
ADLS_ACUITY_SCORE: 23
ADLS_ACUITY_SCORE: 18
ADLS_ACUITY_SCORE: 23
ADLS_ACUITY_SCORE: 18

## 2024-03-02 NOTE — ANESTHESIA PREPROCEDURE EVALUATION
"Anesthesia Pre-Procedure Evaluation    Patient: Laura Singh   MRN: [id number] : 1995        Procedure : Procedure(s):   section          Past Medical History:   Diagnosis Date    Anxiety       Past Surgical History:   Procedure Laterality Date    APPENDECTOMY        Allergies   Allergen Reactions    Morphine Nausea and Vomiting      Social History     Tobacco Use    Smoking status: Never    Smokeless tobacco: Never   Substance Use Topics    Alcohol use: No     Alcohol/week: 0.0 standard drinks of alcohol      Wt Readings from Last 1 Encounters:   23 145.1 kg (320 lb)        Anesthesia Evaluation            ROS/MED HX  ENT/Pulmonary:  - neg pulmonary ROS     Neurologic:       Cardiovascular:     (+)  - - PIH  -  - -                                      METS/Exercise Tolerance:     Hematologic:       Musculoskeletal:       GI/Hepatic:       Renal/Genitourinary:       Endo:     (+)               Obesity (BMI 42),       Psychiatric/Substance Use:       Infectious Disease:       Malignancy:       Other:            Physical Exam    Airway        Mallampati: II   TM distance: > 3 FB   Neck ROM: full   Mouth opening: > 3 cm    Respiratory Devices and Support         Dental           Cardiovascular             Pulmonary                   OUTSIDE LABS:  CBC:   Lab Results   Component Value Date    WBC 10.7 2024    WBC 13.4 (H) 2023    HGB 11.2 (L) 2024    HGB 12.4 2023    HCT 33.8 (L) 2024    HCT 37.3 2023     2024     2023     BMP:   Lab Results   Component Value Date     (L) 2024     2023    POTASSIUM 3.7 2024    POTASSIUM 3.8 2023    CHLORIDE 101 2024    CHLORIDE 101 2023    CO2 22 2024    CO2 25 2023    BUN 9.9 2024    BUN 7.2 2023    CR 0.59 2024    CR 0.52 2023     (H) 2024    GLC 92 2023     COAGS: No results found for: \"PTT\", " "\"INR\", \"FIBR\"  POC: No results found for: \"BGM\", \"HCG\", \"HCGS\"  HEPATIC:   Lab Results   Component Value Date    ALBUMIN 3.3 (L) 02/29/2024    PROTTOTAL 6.2 (L) 02/29/2024    ALT 14 02/29/2024    AST 15 02/29/2024    ALKPHOS 159 (H) 02/29/2024    BILITOTAL <0.2 02/29/2024     OTHER:   Lab Results   Component Value Date    KAYLYN 9.0 02/29/2024       Anesthesia Plan    ASA Status:  3       Anesthesia Type: Spinal.              Consents    Anesthesia Plan(s) and associated risks, benefits, and realistic alternatives discussed. Questions answered and patient/representative(s) expressed understanding.     - Discussed:     - Discussed with:  Patient            Postoperative Care            Comments:               Brittani Hauser MD    I have reviewed the pertinent notes and labs in the chart from the past 30 days and (re)examined the patient.  Any updates or changes from those notes are reflected in this note.             " 23.7

## 2024-03-02 NOTE — PLAN OF CARE
Data: Laura Singh taken to NICU to visit baby at 1340 before she was transferred to 426 via cart at 1415.  Action: Receiving unit notified of transfer: Yes. Patient and family notified of room change. Report given to Liane at 1420. Belongings sent to receiving unit. Accompanied by Registered Nurse. Oriented patient to surroundings. Call light within reach. ID bands double-checked with receiving RN.  Response: Patient tolerated transfer and is stable.    Patients mobililty level scored using the bedside mobility assistance tool (BMAT). Patient is at a mobility level test number: 1. Mobility equipment used: [employer]. Required assist of 3 staff members. Further use of BMAT scoring required.

## 2024-03-02 NOTE — PROVIDER NOTIFICATION
247   Provider Notification   Provider Name/Title Dr. CHRISTI Roberto   Method of Notification Phone   Request Evaluate - Remote   Notification Reason Status Update     Patient just received final dose of cytotec. External monitoring has been very difficult throughout the stay. Jennifer monitor attempted multiple times unsuccessfully. Patient becoming increasingly frustrated with the constant monitoring adjustments. MD called to discuss options for continued plan of care. Original plan was to begin cervidil following completion of cytotec. However, patient  would like to discuss opting for a  due to failure to progress and monitoring difficulties. Since patient recently ate dinner and is not NPO, we discussed the option of scheduling a  for the morning and doing intermittent monitoring overnight. Will discuss plan of care options with the patient.

## 2024-03-02 NOTE — PROVIDER NOTIFICATION
24   Provider Notification   Provider Name/Title Dr. CHRISTI Roberto   Method of Notification Phone   Request Evaluate - Remote   Notification Reason Status Update     Dr. CHRISTI Roberto call transferred to the patient room so the patient and MD could discuss plan of care and answer any questions. Patient is agreeable with plan of care to stop the ripening process and schedule a  for tomorrow at 0930. Per MD, continue with continuous fetal monitoring until 2300. Then 1 hour of monitoring every 4 hours.

## 2024-03-02 NOTE — ANESTHESIA PROCEDURE NOTES
"Intrathecal injection Procedure Note    Pre-Procedure   Staff -        Anesthesiologist:  Brittani Hauser MD       Performed By: anesthesiologist       Location: OR       Procedure Start/Stop Times: 3/2/2024 10:08 AM and 3/2/2024 10:18 AM       Pre-Anesthestic Checklist: patient identified, IV checked, risks and benefits discussed, informed consent, monitors and equipment checked, pre-op evaluation and at physician/surgeon's request  Timeout:       Correct Patient: Yes        Correct Procedure: Yes        Correct Site: Yes        Correct Position: Yes   Procedure Documentation  Procedure: intrathecal injection       Patient Position: sitting       Skin prep: Chloraprep       Insertion Site: L3-4. (midline approach).       Needle Gauge: 25.        Needle Length (Inches): 5        Spinal Needle Type: Pencan       Introducer used       # of attempts: 3 and  # of redirects:  3    Assessment/Narrative         Sensory Level: T6    Medication(s) Administered   0.75% Hyperbaric Bupivacaine (Intrathecal) - Intrathecal   1.6 mL - 3/2/2024 10:08:00 AM  Fentanyl PF (Intrathecal) - Intrathecal   15 mcg - 3/2/2024 10:08:00 AM  Morphine PF 1 mg/mL (Intrathecal) - Intrathecal   0.2 mg - 3/2/2024 10:08:00 AM  Medication Administration Time: 3/2/2024 10:08 AM     Comments:  Difficult spinal due to depth of spinal space -- first attempt with 25G 3.5 in and 5 in needles unsucessful. Ultimately used Tuohy to find ZINA (8cm) and inserted spinal needle through Tuohy          FOR [facility] ONLY:   Pain Team Contact information: please page the Pain Team Via [employer]. Search \"Pain\". During daytime hours, please page the attending first. At night please page the resident first.      "

## 2024-03-02 NOTE — PLAN OF CARE
Pt come from OR at 1128, vital signs stable, denies pain, SOB, nausea or vomiting. Tolerating ice-chips and water. Fundus firm, no trickle or clots noted with fundal checks. Baby in the NICU, pumping initiated using electric breast pump.

## 2024-03-02 NOTE — ANESTHESIA POSTPROCEDURE EVALUATION
Patient: Laura Singh    Procedure: Procedure(s):   section       Anesthesia Type:  Spinal    Note:  Disposition: Inpatient   Postop Pain Control: Uneventful            Sign Out: Well controlled pain   PONV: No   Neuro/Psych: Uneventful            Sign Out: Acceptable/Baseline neuro status   Airway/Respiratory: Uneventful            Sign Out: Acceptable/Baseline resp. status   CV/Hemodynamics: Uneventful            Sign Out: Acceptable CV status; No obvious hypovolemia; No obvious fluid overload   Other NRE:    DID A NON-ROUTINE EVENT OCCUR? No           Last vitals:  Vitals Value Taken Time   BP 99/49 24 1200   Temp 97.6  F (36.4  C) 24 1144   Pulse     Resp     SpO2 97 % 24 1206   Vitals shown include unfiled device data.    Electronically Signed By: Brittani Hauser MD  2024  12:07 PM

## 2024-03-02 NOTE — PLAN OF CARE
VSS. Rested well overnight. Education provided and questions answered regarding morning  section. See flowsheet for FHR documentation. Fetus active. Denies LOF, vaginal bleeding, or painful contractions.

## 2024-03-02 NOTE — PROVIDER NOTIFICATION
03/02/24 0728   Provider Notification   Provider Name/Title Dr. Fajardo   Method of Notification Phone     MD asked for clarification of pre-op orders. Orders received.

## 2024-03-02 NOTE — PROVIDER NOTIFICATION
03/02/24 1733   Provider Notification   Provider Name/Title Dr. Fajardo   Method of Notification Phone   Request Evaluate-Remote     Notified Dr. Fajardo of UOP of 28ml/hour over last 3 hours.  Ordered 500ml LR bolus and to keep davison in until UOP is 30ml/hr for three hours.

## 2024-03-02 NOTE — PLAN OF CARE
VSS. Up with stand by assist. Steen in and draining properly.  Low UOP, 500ml bolus ordered.  Steen to stay in until UOP is 30ml/hour for three hours. Lochia scant, no clots. Fundus firm and midline. Pain managed with torodol and tylenol.  Pumping, tolerating well. FOB supportive and at bedside. NICU ipad at bedside.

## 2024-03-02 NOTE — PROGRESS NOTES
2024        29 yo  @ 37.2 wga here for IOL for pre-eclampsia without severe features.  She had a very unfavorable cervix, which did not make any change despite 24 hours of cervical ripening.  Patient spoke to on-call MD last night in regards to reassessing plan of care, and desired to proceed with  section.  After discussion, decision was made to proceed with  section this AM since pt had eaten dinner already.  Ripening efforts were discontinued overnight.      BP (!) 145/75   Temp 98.1  F (36.7  C) (Oral)   Resp 16   Gen: NAD, A&O x 3  Abd: gravid, NT      29 yo  @ 37.2 wga initially presented for IOL for pre-eclampsia without severe features, however with no cervical change after 24 hours of cervical ripening and unfavorable cervix.  Decision made to proceed with  section this AM for delivery.  Risks/benefits of  section were reviewed with the pt, including risks of: vascular injury with possible need for transfusion, visceral injury including but not limited to damage to bowel/bladder/ureter, infection, prolonged hospitalization, and possible reoperation.  Pt understands and agrees with the plan of care, all questions answered and consents signed.      DONNY TATE MD

## 2024-03-02 NOTE — ANESTHESIA CARE TRANSFER NOTE
Patient: Laura Singh    Procedure: Procedure(s):   section       Diagnosis: 37 weeks gestation of pregnancy [Z3A.37]  Diagnosis Additional Information: No value filed.    Anesthesia Type:   Spinal     Note:      Level of Consciousness: awake  Oxygen Supplementation: room air    Independent Airway: airway patency satisfactory and stable  Dentition: dentition unchanged  Vital Signs Stable: post-procedure vital signs reviewed and stable  Report to RN Given: handoff report given  Patient transferred to: Labor and Delivery    Handoff Report: Identifed the Patient, Identified the Reponsible Provider, Reviewed the pertinent medical history, Discussed the surgical course, Reviewed Intra-OP anesthesia mangement and issues during anesthesia, Set expectations for post-procedure period and Allowed opportunity for questions and acknowledgement of understanding      Vitals:  Vitals Value Taken Time   BP     Temp     Pulse     Resp     SpO2         Electronically Signed By: EDER Lee CRNA  2024  11:20 AM

## 2024-03-03 LAB — HGB BLD-MCNC: 10.1 G/DL (ref 11.7–15.7)

## 2024-03-03 PROCEDURE — 36415 COLL VENOUS BLD VENIPUNCTURE: CPT | Performed by: OBSTETRICS & GYNECOLOGY

## 2024-03-03 PROCEDURE — 250N000013 HC RX MED GY IP 250 OP 250 PS 637: Performed by: OBSTETRICS & GYNECOLOGY

## 2024-03-03 PROCEDURE — 250N000011 HC RX IP 250 OP 636: Performed by: OBSTETRICS & GYNECOLOGY

## 2024-03-03 PROCEDURE — 85018 HEMOGLOBIN: CPT | Performed by: OBSTETRICS & GYNECOLOGY

## 2024-03-03 PROCEDURE — 120N000001 HC R&B MED SURG/OB

## 2024-03-03 RX ORDER — FERROUS SULFATE 325(65) MG
325 TABLET ORAL DAILY
Status: DISCONTINUED | OUTPATIENT
Start: 2024-03-03 | End: 2024-03-06 | Stop reason: HOSPADM

## 2024-03-03 RX ADMIN — ACETAMINOPHEN 975 MG: 325 TABLET, FILM COATED ORAL at 09:23

## 2024-03-03 RX ADMIN — ACETAMINOPHEN 975 MG: 325 TABLET, FILM COATED ORAL at 22:12

## 2024-03-03 RX ADMIN — KETOROLAC TROMETHAMINE 30 MG: 30 INJECTION, SOLUTION INTRAMUSCULAR; INTRAVENOUS at 04:03

## 2024-03-03 RX ADMIN — SENNOSIDES AND DOCUSATE SODIUM 2 TABLET: 8.6; 5 TABLET ORAL at 22:13

## 2024-03-03 RX ADMIN — NIFEDIPINE 30 MG: 30 TABLET, FILM COATED, EXTENDED RELEASE ORAL at 22:12

## 2024-03-03 RX ADMIN — IBUPROFEN 800 MG: 800 TABLET, FILM COATED ORAL at 22:13

## 2024-03-03 RX ADMIN — IBUPROFEN 800 MG: 800 TABLET, FILM COATED ORAL at 09:40

## 2024-03-03 RX ADMIN — ACETAMINOPHEN 975 MG: 325 TABLET, FILM COATED ORAL at 15:57

## 2024-03-03 RX ADMIN — ENOXAPARIN SODIUM 40 MG: 40 INJECTION SUBCUTANEOUS at 12:51

## 2024-03-03 RX ADMIN — SERTRALINE HYDROCHLORIDE 100 MG: 50 TABLET ORAL at 22:12

## 2024-03-03 RX ADMIN — IBUPROFEN 800 MG: 800 TABLET, FILM COATED ORAL at 15:57

## 2024-03-03 RX ADMIN — FERROUS SULFATE TAB 325 MG (65 MG ELEMENTAL FE) 325 MG: 325 (65 FE) TAB at 12:51

## 2024-03-03 RX ADMIN — ACETAMINOPHEN 975 MG: 325 TABLET, FILM COATED ORAL at 03:14

## 2024-03-03 RX ADMIN — SENNOSIDES AND DOCUSATE SODIUM 1 TABLET: 50; 8.6 TABLET ORAL at 09:23

## 2024-03-03 ASSESSMENT — ACTIVITIES OF DAILY LIVING (ADL)
ADLS_ACUITY_SCORE: 23
ADLS_ACUITY_SCORE: 18
ADLS_ACUITY_SCORE: 23
ADLS_ACUITY_SCORE: 18
ADLS_ACUITY_SCORE: 23
ADLS_ACUITY_SCORE: 18
ADLS_ACUITY_SCORE: 23
ADLS_ACUITY_SCORE: 18
ADLS_ACUITY_SCORE: 18

## 2024-03-03 NOTE — PLAN OF CARE
Goal Outcome Evaluation:      Plan of Care Reviewed With: patient, spouse    Overall Patient Progress: improvingOverall Patient Progress: improving         Patient's vital signs stable, up independently in room. Urine output was adequate from 3-6am this morning with 150mls of output in 3 hours, so davison was removed at 0630 this AM, unable to void on her own after taken out but was going to take a shower and try again afterwards. Pain is managed with scheduled pain medications. Pumping every 3-4 hours, with no milk output yet but knows to keep up with pumping to help stimulate production. NICU ipad set up in the room. Significant other at bedside, supportive of patient.

## 2024-03-03 NOTE — PROGRESS NOTES
March 3, 2024      SUBJECTIVE: No acute overnight events.  Pain adequately controlled.  +Lochia, light.  Tolerating PO.  + Flatus.  Ambulating/urinating w/o difficulty.  Denies CP/palp/SOB/LH.    OBJECTIVE: /70 (BP Location: Left arm, Patient Position: Semi-Garcia's, Cuff Size: Adult Large)   Pulse 64   Temp 97.6  F (36.4  C) (Oral)   Resp 18   SpO2 94%   Gen: NAD, A&O x3  Abd: soft.  Incision C/D/I, no active bleeding.  No erythema, induration, or discharge; steri strips and tegaderm in place  Ext: mild edema BL LEs, symmetric, no CT    Hemoglobin   Date Value Ref Range Status   03/03/2024 10.1 (L) 11.7 - 15.7 g/dL Final   02/29/2024 11.2 (L) 11.7 - 15.7 g/dL Final   ]    A/P: POD#1 s/p primary LTCS for failed induction for pre-eclampsia without severe features.  Doing well.  Afebrile, VSS.  - ibuprofen/oxycodone/tylenol PRN pain  - BP stable/wnl on nifedipine 30 mg XL Qday (during pregnancy was on 60 mg XL Qday as well as labetalol).  If BP drop lower, would stop nifedipine  - regular diet as tolerated  - breastfeeding/pumping  - baby in NICU, doing well, well likely go to floor today per pt  - mild anemia from pregnancy, delivery/surgery: ferrous sulfate daily  - encouraged ambulation  - routine post-op care        DONNY TATE MD

## 2024-03-03 NOTE — LACTATION NOTE
This note was copied from a baby's chart.  Lactation visit in NICU for breastfeed.  When writer came to room infant was actively spitting up- RN at bedside and assisting.  Infant brought STS to left breast in cross cradle- reviewed deep latch and positioning techniques.  Infant sighing at breast and not attempting to open mouth with nose to nipple. Discussed infant may be regulating breathing after spitty episode. Put STS.  After few minutes infant sucking on pacifier so brought back to left breast- infant briefly opened mouth but did not grasp nipple.  Infant sleepy and started intermittent sighing again. Reviewed hand expression techniques with colostrum drops noted however infant still refusing to open mouth.  Reinforced previous education on potential feeding behaviors and reading infant cues. RN at bedside and will work with parents on attempting bottle feed.  Encouraged to pump after. Per bedside RN infant may be transferring to mothers room late afternoon/evening.  Encouraged to follow up with lactation tomorrow. All questions answered.

## 2024-03-03 NOTE — LACTATION NOTE
Lactation visit; this is Laura's first baby and infant in NICU- was on CPAP.  Laura pumping but would like lactation assistance to verify using correctly.  Assisted with pump set up, reviewed pump settings and care/cleaning of parts.  24 mm flange size too big- switched to 21mm.  Laura has spectra pump for home.  Educated on importance of q3 hour pumping to establish supply.  Benefits of STS and hand expression reviewed.  Infant off CPAP this morning and NICU RN reporting breast fed well for 10-15 minutes.    Reviewed potential feeding behaviors in 37 week GA.  Encouraged to attempt breastfeeding and then pump after.  Writer to assist with next breastfeed in NICU.

## 2024-03-04 PROBLEM — O14.90 PREECLAMPSIA: Status: ACTIVE | Noted: 2024-03-04

## 2024-03-04 LAB
ALBUMIN SERPL BCG-MCNC: 3.2 G/DL (ref 3.5–5.2)
ALP SERPL-CCNC: 122 U/L (ref 40–150)
ALT SERPL W P-5'-P-CCNC: 12 U/L (ref 0–50)
ANION GAP SERPL CALCULATED.3IONS-SCNC: 12 MMOL/L (ref 7–15)
AST SERPL W P-5'-P-CCNC: 15 U/L (ref 0–45)
BILIRUB SERPL-MCNC: <0.2 MG/DL
BUN SERPL-MCNC: 14.5 MG/DL (ref 6–20)
CALCIUM SERPL-MCNC: 9 MG/DL (ref 8.6–10)
CHLORIDE SERPL-SCNC: 102 MMOL/L (ref 98–107)
CREAT SERPL-MCNC: 0.75 MG/DL (ref 0.51–0.95)
DEPRECATED HCO3 PLAS-SCNC: 24 MMOL/L (ref 22–29)
EGFRCR SERPLBLD CKD-EPI 2021: >90 ML/MIN/1.73M2
ERYTHROCYTE [DISTWIDTH] IN BLOOD BY AUTOMATED COUNT: 13.4 % (ref 10–15)
GLUCOSE SERPL-MCNC: 100 MG/DL (ref 70–99)
HCT VFR BLD AUTO: 31.9 % (ref 35–47)
HGB BLD-MCNC: 10.2 G/DL (ref 11.7–15.7)
MCH RBC QN AUTO: 28.1 PG (ref 26.5–33)
MCHC RBC AUTO-ENTMCNC: 32 G/DL (ref 31.5–36.5)
MCV RBC AUTO: 88 FL (ref 78–100)
PLATELET # BLD AUTO: 327 10E3/UL (ref 150–450)
POTASSIUM SERPL-SCNC: 3.9 MMOL/L (ref 3.4–5.3)
PROT SERPL-MCNC: 6.2 G/DL (ref 6.4–8.3)
RBC # BLD AUTO: 3.63 10E6/UL (ref 3.8–5.2)
SODIUM SERPL-SCNC: 138 MMOL/L (ref 135–145)
WBC # BLD AUTO: 12 10E3/UL (ref 4–11)

## 2024-03-04 PROCEDURE — 250N000013 HC RX MED GY IP 250 OP 250 PS 637: Performed by: OBSTETRICS & GYNECOLOGY

## 2024-03-04 PROCEDURE — 120N000001 HC R&B MED SURG/OB

## 2024-03-04 PROCEDURE — 36415 COLL VENOUS BLD VENIPUNCTURE: CPT | Performed by: OBSTETRICS & GYNECOLOGY

## 2024-03-04 PROCEDURE — 250N000011 HC RX IP 250 OP 636: Performed by: OBSTETRICS & GYNECOLOGY

## 2024-03-04 PROCEDURE — 250N000011 HC RX IP 250 OP 636: Mod: JZ | Performed by: OBSTETRICS & GYNECOLOGY

## 2024-03-04 PROCEDURE — 85027 COMPLETE CBC AUTOMATED: CPT | Performed by: OBSTETRICS & GYNECOLOGY

## 2024-03-04 PROCEDURE — 80053 COMPREHEN METABOLIC PANEL: CPT | Performed by: OBSTETRICS & GYNECOLOGY

## 2024-03-04 PROCEDURE — 999N000079 HC STATISTIC IP LACTATION SERVICES 1-15 MIN

## 2024-03-04 RX ORDER — LABETALOL HYDROCHLORIDE 5 MG/ML
20-80 INJECTION, SOLUTION INTRAVENOUS EVERY 10 MIN PRN
Status: DISCONTINUED | OUTPATIENT
Start: 2024-03-04 | End: 2024-03-06 | Stop reason: HOSPADM

## 2024-03-04 RX ORDER — LIDOCAINE 40 MG/G
CREAM TOPICAL
Status: DISCONTINUED | OUTPATIENT
Start: 2024-03-04 | End: 2024-03-06 | Stop reason: HOSPADM

## 2024-03-04 RX ORDER — HYDRALAZINE HYDROCHLORIDE 20 MG/ML
10 INJECTION INTRAMUSCULAR; INTRAVENOUS
Status: DISCONTINUED | OUTPATIENT
Start: 2024-03-04 | End: 2024-03-06 | Stop reason: HOSPADM

## 2024-03-04 RX ORDER — SODIUM CHLORIDE, SODIUM LACTATE, POTASSIUM CHLORIDE, CALCIUM CHLORIDE 600; 310; 30; 20 MG/100ML; MG/100ML; MG/100ML; MG/100ML
10-125 INJECTION, SOLUTION INTRAVENOUS CONTINUOUS
Status: DISCONTINUED | OUTPATIENT
Start: 2024-03-04 | End: 2024-03-06 | Stop reason: HOSPADM

## 2024-03-04 RX ADMIN — LABETALOL HYDROCHLORIDE 20 MG: 5 INJECTION, SOLUTION INTRAVENOUS at 17:38

## 2024-03-04 RX ADMIN — SENNOSIDES AND DOCUSATE SODIUM 2 TABLET: 8.6; 5 TABLET ORAL at 20:12

## 2024-03-04 RX ADMIN — ACETAMINOPHEN 975 MG: 325 TABLET, FILM COATED ORAL at 04:01

## 2024-03-04 RX ADMIN — FERROUS SULFATE TAB 325 MG (65 MG ELEMENTAL FE) 325 MG: 325 (65 FE) TAB at 08:45

## 2024-03-04 RX ADMIN — IBUPROFEN 800 MG: 800 TABLET, FILM COATED ORAL at 16:48

## 2024-03-04 RX ADMIN — ACETAMINOPHEN 975 MG: 325 TABLET, FILM COATED ORAL at 16:48

## 2024-03-04 RX ADMIN — IBUPROFEN 800 MG: 800 TABLET, FILM COATED ORAL at 04:01

## 2024-03-04 RX ADMIN — ACETAMINOPHEN 975 MG: 325 TABLET, FILM COATED ORAL at 22:31

## 2024-03-04 RX ADMIN — ENOXAPARIN SODIUM 40 MG: 40 INJECTION SUBCUTANEOUS at 10:25

## 2024-03-04 RX ADMIN — ENOXAPARIN SODIUM 40 MG: 40 INJECTION SUBCUTANEOUS at 00:53

## 2024-03-04 RX ADMIN — SENNOSIDES AND DOCUSATE SODIUM 2 TABLET: 8.6; 5 TABLET ORAL at 08:44

## 2024-03-04 RX ADMIN — NIFEDIPINE 30 MG: 30 TABLET, FILM COATED, EXTENDED RELEASE ORAL at 19:54

## 2024-03-04 RX ADMIN — IBUPROFEN 800 MG: 800 TABLET, FILM COATED ORAL at 22:31

## 2024-03-04 RX ADMIN — ACETAMINOPHEN 975 MG: 325 TABLET, FILM COATED ORAL at 10:25

## 2024-03-04 RX ADMIN — ENOXAPARIN SODIUM 40 MG: 40 INJECTION SUBCUTANEOUS at 22:31

## 2024-03-04 RX ADMIN — SERTRALINE HYDROCHLORIDE 100 MG: 50 TABLET ORAL at 19:54

## 2024-03-04 RX ADMIN — IBUPROFEN 800 MG: 800 TABLET, FILM COATED ORAL at 10:25

## 2024-03-04 ASSESSMENT — ACTIVITIES OF DAILY LIVING (ADL)
ADLS_ACUITY_SCORE: 18

## 2024-03-04 NOTE — CONSULTS
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    Baby Supplies: have all supplies     Mental Health History: longtime history of anxiety has managed it with therapy/setraline in past. Anxiety increased in pregnancy. Increased dose of sertraline and symptoms have felt more manageable. Still experiencing anxiety but at baseline.     History of Postpartum Mood Disorders: Provided with resources, discussed baby blues vs PPMD, educated on emergency warning signs/symptoms.      Chemical Health History: Na        INTERVENTION:      SW completed chart review and collaborated with the multidisciplinary team.   Psychosocial Assessment   Introduction to Maternal Child Health  role and scope of practice   Reviewed Hospital and Community Resources   Assessed Chemical Health History and Current Symptoms   Assessed Mental Health History and Current Symptoms   Identified stressors, barriers and family concerns   Provided support and active empathetic listening and validation.   Provided psychoeducation on  mood and anxiety disorders, assessed for any current symptoms or history    ASSESSMENT:    Sw met with pt to complete assessment. Baby \"Reg\" initially in NICU but transferred upstairs last night. Pt very happy to have him with her. She reports that she has all baby supplies. This is her first pregnancy. Had a positive pregnancy/birth experience. Discussed mental health history. Pt was receptive to education/resources and sw has no other " concerns. Pt declined home visiting nurse.    No other needs.        PLAN:       ARTHUR Trammell, TARIK  Inpatient Care Coordination    [phone]

## 2024-03-04 NOTE — PROGRESS NOTES
POD2  Feels well, pain controlled with tylenol and ibuprofen.  /72 (BP Location: Left arm, Patient Position: Semi-Garcia's, Cuff Size: Adult Regular)   Pulse 66   Temp 97.6  F (36.4  C) (Oral)   Resp 18   SpO2 94%    NAD  Abd Soft, ND  Inc CDI  Ext NT    POD2 s/p LTCS  - desires discharge home  - continue procardia XL 30 mg daily  - follow-up this week for BP ck    Shavon Roberto MD

## 2024-03-04 NOTE — LACTATION NOTE
Lactation in to visit with patient. Baby up from NICU. Has been triple feeding. Getting baby to breast for a short time, then following with formula and is pumping. Getting small volumes. Assisted with a short feed before circumcision. Baby latched without shield. Proper positioning showed in cross cradle. Encouraged nipple to nose and baby latched. Breast compressions done and swallows pointed out to parents. Baby did good swallows before fatigue. Nursed about 10 minutes. Baby then followed up with bottle. Paced bottle feeds shown. Discussed importance of slowing down the bottle feeds. Reminded on milk storage guidelines. Baby going for circ. Discussed possible sleepiness post procedure.    Writer in to help with a feed. Laura states she is just going to breastfeed with the current feed. Knows she can continue to call as needed.    In to assist with a feed. Baby struggled to latch. Once latched baby not interested in sucking. Patient to pump and supplement with formula via bottle. Reviewed post circ possible sleepiness. Discussed watching cues and cluster feeds.

## 2024-03-04 NOTE — PROGRESS NOTES
Public Health Nurse (PHN) in to see patient to discuss public health programs. PHN offered [facility] contact information but patient not interested.

## 2024-03-04 NOTE — PLAN OF CARE
Vital signs stable. Fundus firm, midline, 1cm below umbilicus. Lochia rubra and scant. C/S incision covered with steri-strips and tegaderm. Old, dried drainage that is unchanged throughout shift. Voiding without difficulty. Ambulating independently. Pain managed with Tylenol and Ibuprofen. Breastfeeding and tolerating with nipple shield and assistance for latch positioning and encouragement. Supplementing with formula 20 - 25 mL. Intermittently pumping overnight, encouraging to do so if infant sleepy at breast to protect milk supply. FOB at bedside and attentive to and supportive of patient. Bonding well with infant. Independent with self and infant cares. Encouraging pt to call with any questions or concerns. Will monitor as needed and continue with plan of care.

## 2024-03-05 LAB
ALBUMIN SERPL BCG-MCNC: 3.2 G/DL (ref 3.5–5.2)
ALP SERPL-CCNC: 118 U/L (ref 40–150)
ALT SERPL W P-5'-P-CCNC: 25 U/L (ref 0–50)
AST SERPL W P-5'-P-CCNC: 33 U/L (ref 0–45)
BILIRUB DIRECT SERPL-MCNC: <0.2 MG/DL (ref 0–0.3)
BILIRUB SERPL-MCNC: 0.2 MG/DL
PLATELET # BLD AUTO: 299 10E3/UL (ref 150–450)
PROT SERPL-MCNC: 6.3 G/DL (ref 6.4–8.3)

## 2024-03-05 PROCEDURE — 250N000013 HC RX MED GY IP 250 OP 250 PS 637: Performed by: OBSTETRICS & GYNECOLOGY

## 2024-03-05 PROCEDURE — 36415 COLL VENOUS BLD VENIPUNCTURE: CPT | Performed by: OBSTETRICS & GYNECOLOGY

## 2024-03-05 PROCEDURE — 250N000011 HC RX IP 250 OP 636: Performed by: OBSTETRICS & GYNECOLOGY

## 2024-03-05 PROCEDURE — 84155 ASSAY OF PROTEIN SERUM: CPT | Performed by: OBSTETRICS & GYNECOLOGY

## 2024-03-05 PROCEDURE — 85049 AUTOMATED PLATELET COUNT: CPT | Performed by: OBSTETRICS & GYNECOLOGY

## 2024-03-05 PROCEDURE — 82247 BILIRUBIN TOTAL: CPT | Performed by: OBSTETRICS & GYNECOLOGY

## 2024-03-05 PROCEDURE — 120N000001 HC R&B MED SURG/OB

## 2024-03-05 RX ORDER — NIFEDIPINE 30 MG
60 TABLET, EXTENDED RELEASE ORAL DAILY
Qty: 30 TABLET | Refills: 0 | Status: SHIPPED | OUTPATIENT
Start: 2024-03-05 | End: 2024-03-06

## 2024-03-05 RX ORDER — NIFEDIPINE 30 MG/1
30 TABLET, EXTENDED RELEASE ORAL ONCE
Status: COMPLETED | OUTPATIENT
Start: 2024-03-05 | End: 2024-03-05

## 2024-03-05 RX ORDER — NIFEDIPINE 30 MG/1
60 TABLET, EXTENDED RELEASE ORAL DAILY
Status: DISCONTINUED | OUTPATIENT
Start: 2024-03-05 | End: 2024-03-06 | Stop reason: HOSPADM

## 2024-03-05 RX ORDER — LABETALOL 100 MG/1
100 TABLET, FILM COATED ORAL EVERY 12 HOURS SCHEDULED
Status: DISCONTINUED | OUTPATIENT
Start: 2024-03-05 | End: 2024-03-06 | Stop reason: HOSPADM

## 2024-03-05 RX ADMIN — IBUPROFEN 800 MG: 800 TABLET, FILM COATED ORAL at 11:51

## 2024-03-05 RX ADMIN — NIFEDIPINE 60 MG: 30 TABLET, FILM COATED, EXTENDED RELEASE ORAL at 19:17

## 2024-03-05 RX ADMIN — ENOXAPARIN SODIUM 40 MG: 40 INJECTION SUBCUTANEOUS at 23:10

## 2024-03-05 RX ADMIN — ACETAMINOPHEN 975 MG: 325 TABLET, FILM COATED ORAL at 11:51

## 2024-03-05 RX ADMIN — NIFEDIPINE 30 MG: 30 TABLET, FILM COATED, EXTENDED RELEASE ORAL at 08:34

## 2024-03-05 RX ADMIN — SENNOSIDES AND DOCUSATE SODIUM 2 TABLET: 8.6; 5 TABLET ORAL at 08:34

## 2024-03-05 RX ADMIN — IBUPROFEN 800 MG: 800 TABLET, FILM COATED ORAL at 05:27

## 2024-03-05 RX ADMIN — ACETAMINOPHEN 975 MG: 325 TABLET, FILM COATED ORAL at 23:10

## 2024-03-05 RX ADMIN — SERTRALINE HYDROCHLORIDE 100 MG: 50 TABLET ORAL at 19:52

## 2024-03-05 RX ADMIN — ACETAMINOPHEN 975 MG: 325 TABLET, FILM COATED ORAL at 18:06

## 2024-03-05 RX ADMIN — ENOXAPARIN SODIUM 40 MG: 40 INJECTION SUBCUTANEOUS at 11:52

## 2024-03-05 RX ADMIN — FERROUS SULFATE TAB 325 MG (65 MG ELEMENTAL FE) 325 MG: 325 (65 FE) TAB at 08:34

## 2024-03-05 RX ADMIN — IBUPROFEN 800 MG: 800 TABLET, FILM COATED ORAL at 23:10

## 2024-03-05 RX ADMIN — ACETAMINOPHEN 975 MG: 325 TABLET, FILM COATED ORAL at 05:26

## 2024-03-05 RX ADMIN — METOCLOPRAMIDE HYDROCHLORIDE 10 MG: 5 INJECTION INTRAMUSCULAR; INTRAVENOUS at 16:59

## 2024-03-05 RX ADMIN — LABETALOL HYDROCHLORIDE 100 MG: 100 TABLET, FILM COATED ORAL at 12:47

## 2024-03-05 RX ADMIN — LABETALOL HYDROCHLORIDE 100 MG: 100 TABLET, FILM COATED ORAL at 19:16

## 2024-03-05 RX ADMIN — LABETALOL HYDROCHLORIDE 20 MG: 5 INJECTION, SOLUTION INTRAVENOUS at 12:10

## 2024-03-05 RX ADMIN — IBUPROFEN 800 MG: 800 TABLET, FILM COATED ORAL at 18:06

## 2024-03-05 ASSESSMENT — ACTIVITIES OF DAILY LIVING (ADL)
ADLS_ACUITY_SCORE: 18

## 2024-03-05 NOTE — PROGRESS NOTES
March 5, 2024      Was notified by RN ~ noon regarding severe range BPs again, pt given a dose of labetalol 20 mg IV per hypertensive protocol (she did received an IV dose yesterday x 1 as well).  Earlier this AM had added nifedipine 30 mg XL x 1 dose, with plan to increase her daily night time dose from 30mg to 60 mg XL Qday.  Given severe range BPs again, will also restart labetalol 100 mg BID, give first dose now.  Only platelets were checked this AM, will also add LFTs.  Given suboptimal control of BPs, will not discharge today.      DONNY TATE MD

## 2024-03-05 NOTE — PROGRESS NOTES
March 5, 2024      SUBJECTIVE: Pt did have severe range BPs yesterday, received labetalol 20 mg IV x 1 dose.  Pain adequately controlled.  +Lochia, .  Tolerating PO.  + Flatus.  Ambulating/urinating w/o difficulty.  Denies CP/palp/SOB/LH.    OBJECTIVE: /69 (BP Location: Right arm, Patient Position: Semi-Garcia's, Cuff Size: Adult Regular)   Pulse 73   Temp 98.2  F (36.8  C) (Oral)   Resp 18   SpO2 94%   Gen: NAD, A&O x3  Abd: soft.  Incision C/D/I, no active bleeding.  No erythema, induration, or discharge; tegaderm, steri strips in palce  Ext: moderate edema BL LEs, symmetric, no CT    Hemoglobin   Date Value Ref Range Status   03/04/2024 10.2 (L) 11.7 - 15.7 g/dL Final   03/03/2024 10.1 (L) 11.7 - 15.7 g/dL Final   ]    A/P: POD#3 s/p LTCS, failed induction for pre-eclampsia without severe features  Doing well.  Afebrile, VSS.  - ibuprofen/oxycodone/tylenol PRN pain  - plan to increase nifedipine - will give additional 30 mg XL dose now, then increase daily dose to 60 mg XL Qday  - regular diet as tolerated  - breastfeeding  - encouraged ambulation  - routine post-op care  - possible discharge home later this afternoon if BP remain stable, with close f/u in the office within 2-3 days for BP check        DONNY TATE MD

## 2024-03-05 NOTE — PROVIDER NOTIFICATION
03/04/24 1726   Provider Notification   Provider Name/Title Dr Booker   Method of Notification Phone   Request Evaluate-Remote   Notification Reason Vital Signs Change     MD returning page. Updated MD patient had two severe range blood pressures however there are no orders for emergency hypertensive medication. Patient is on 30 mg of procardia. IV has been placed. MD reviewed chart. MD will place orders for labs and hypertensive medications. Primary RN updated.

## 2024-03-05 NOTE — PLAN OF CARE
Patient meeting expected goals. Is up independent in room, meeting all personal needs.VSS. /80. MD ordered a one time additional dose of Nifedipine 30 mg now, and home dose going forward will be 60 mg daily. Patient denies any preeclamptic sx.. MD says ok to discharge in afternoon if BP's remain stable. Pain is being managed with Tylenol and Ibuprofen. Voiding with out difficulty. Incision to low abdomen is with steri strips and barrier film, dried drainage noted, with no signs of infection noted to surrounding tissue. Encouraged frequent pumping.

## 2024-03-05 NOTE — PROVIDER NOTIFICATION
03/05/24 1215   Provider Notification   Provider Name/Title Dr Fajardo   Method of Notification Electronic Page;Phone   Request Evaluate-Remote   Notification Reason Vital Signs Change     Notified Dr Cr Anaya of patients severe range BP x2. MD will place orders for LFT's and make changes to medications.

## 2024-03-05 NOTE — PLAN OF CARE
Blood pressures elevated - two severes within 15 min, started hypertensive protocol with labetelol. Up ad cira. Voiding without difficulty. Lochia scant, no clots. Fundus firm and midline. Pain managed with tylenol and ibuprofen.  Pumping, tolerating well. FOB supportive and at bedside. Baby sent down to NICU at 1750.

## 2024-03-05 NOTE — PROVIDER NOTIFICATION
03/04/24 1710   Provider Notification   Provider Name/Title Dr. Booker   Method of Notification Phone   Request Evaluate-Remote   Notification Reason Vital Signs Change     Paged to notify Dr. Booker of severe range BP's

## 2024-03-05 NOTE — PLAN OF CARE
Pt had a few mildly elevated Bps overnight, other VSS. Denies preeclampsia symptoms. Postpartum checks WDL. C/s incision has scant dried drainage. Tolerating regular diet and able to ambulate independently. Urine output adequate, voids without difficulty. Pt utilizing tylenol and ibuprofen for pain management. Pumping for  in the NICU.

## 2024-03-06 VITALS
TEMPERATURE: 97.7 F | HEART RATE: 98 BPM | SYSTOLIC BLOOD PRESSURE: 151 MMHG | DIASTOLIC BLOOD PRESSURE: 77 MMHG | WEIGHT: 293 LBS | RESPIRATION RATE: 18 BRPM | BODY MASS INDEX: 44.73 KG/M2 | OXYGEN SATURATION: 94 %

## 2024-03-06 LAB
PATH REPORT.COMMENTS IMP SPEC: NORMAL
PATH REPORT.COMMENTS IMP SPEC: NORMAL
PATH REPORT.FINAL DX SPEC: NORMAL
PATH REPORT.GROSS SPEC: NORMAL
PATH REPORT.MICROSCOPIC SPEC OTHER STN: NORMAL
PATH REPORT.RELEVANT HX SPEC: NORMAL
PHOTO IMAGE: NORMAL

## 2024-03-06 PROCEDURE — 250N000013 HC RX MED GY IP 250 OP 250 PS 637: Performed by: OBSTETRICS & GYNECOLOGY

## 2024-03-06 RX ORDER — LABETALOL 100 MG/1
100 TABLET, FILM COATED ORAL EVERY 12 HOURS
Qty: 30 TABLET | Refills: 0 | Status: SHIPPED | OUTPATIENT
Start: 2024-03-06

## 2024-03-06 RX ORDER — ACETAMINOPHEN 325 MG/1
975 TABLET ORAL EVERY 6 HOURS
Qty: 30 TABLET | Refills: 0 | Status: SHIPPED | OUTPATIENT
Start: 2024-03-06

## 2024-03-06 RX ORDER — IBUPROFEN 800 MG/1
800 TABLET, FILM COATED ORAL EVERY 6 HOURS
Qty: 30 TABLET | Refills: 0 | Status: SHIPPED | OUTPATIENT
Start: 2024-03-06

## 2024-03-06 RX ADMIN — SENNOSIDES AND DOCUSATE SODIUM 2 TABLET: 8.6; 5 TABLET ORAL at 08:05

## 2024-03-06 RX ADMIN — IBUPROFEN 800 MG: 800 TABLET, FILM COATED ORAL at 05:00

## 2024-03-06 RX ADMIN — LABETALOL HYDROCHLORIDE 100 MG: 100 TABLET, FILM COATED ORAL at 08:05

## 2024-03-06 RX ADMIN — FERROUS SULFATE TAB 325 MG (65 MG ELEMENTAL FE) 325 MG: 325 (65 FE) TAB at 08:05

## 2024-03-06 RX ADMIN — ACETAMINOPHEN 975 MG: 325 TABLET, FILM COATED ORAL at 05:00

## 2024-03-06 ASSESSMENT — ACTIVITIES OF DAILY LIVING (ADL)
ADLS_ACUITY_SCORE: 18

## 2024-03-06 NOTE — PROGRESS NOTES
[facility]   Obstetrics Post-Op / Progress Note         Interval History:     Doing well.  Pain is well-controlled.  Ambulatory. Voiding independently. Breastfeeding well. Lochia within normal limits, denies clots.              Physical Exam:   All vitals stable  Patient Vitals for the past 8 hrs:   BP Temp Temp src Pulse Resp Weight   24 0556 (!) 148/87 98  F (36.7  C) Oral 103 16 (!) 153.8 kg (339 lb 1.1 oz)   24 0300 126/66 97.8  F (36.6  C) Oral -- 16 --       Constitutional: healthy, alert, no distress.   Abdomen: Abdomen soft, non-tender. BS normal. No masses, fundus is firm.  Incision: Clean, dry and intact, no erythema or induration.  Extremities: minimal edema            Data:   All laboratory data related to this surgery reviewed  Lab Results   Component Value Date    HGB 10.2 (L) 2024            Assessment and Plan:    Assessment:   Post-operative day #4  Low transverse primary  section, failed induction for pre-eclampsia without severe features         Doing well, BPs improved      Plan:   Ambulation encouraged  Pain control: acetaminophen and ibuprofen PRN  Diet as tolerated  Activity as tolerated  Continue cares  Dispo: anticipate discharge home today, orders placed. Instructed to monitor BP at home, parameters provided. RTC Friday for BP check, or sooner PRN.   Rx: 60 mg nifedipine every day, and labetalol 100mg BID.         Adry Weinberg PA-C

## 2024-03-06 NOTE — PROVIDER NOTIFICATION
03/05/24 1510 03/05/24 1610 03/05/24 1652   Vital Signs   BP (!) 153/81 (!) 157/82 (!) 155/75      03/05/24 1710 03/05/24 1810   Vital Signs   BP (!) 155/75 (!) 143/77     Frequent BP checks during shift for post-treatment monitoring. Last hourly check done at 1910. Nighttime P.O. BP meds given following last check. Other VSS. Uterine fundus is firm and midline. Scant vaginal bleeding. Using Tylenol and Ibuprofen for pain management. Pt encouraged to rest during shift. Visited baby in NICU via wheelchair. Adequate I/Os. Breastfeeding infant in NICU, pumping while on unit. Questions/concerns addressed. Family at bedside, supportive.

## 2024-03-06 NOTE — DISCHARGE INSTRUCTIONS
Monitor BP twice daily. Call if >145/>95 consistently. Return to clinic on Friday for a BP check and again at 2 weeks and 6 weeks postpartum for incision check and postpartum physical.      Section: What to Expect at Home  Your Recovery     A  section, or , is surgery to deliver your baby through a cut that the doctor makes in your lower belly and uterus. The cut is called an incision.  You may have some pain in your lower belly and need pain medicine for 1 to 2 weeks. You can expect some vaginal bleeding for several weeks. You will probably need about 6 weeks to fully recover.  It's important to take it easy while the incision heals. Avoid heavy lifting, strenuous activities, and exercises that strain the belly muscles while you recover. Ask a family member or friend for help with housework, cooking, and shopping.  This care sheet gives you a general idea about how long it will take for you to recover. But each person recovers at a different pace. Follow the steps below to get better as quickly as possible.  How can you care for yourself at home?  Activity    Rest when you feel tired. Getting enough sleep will help you recover.     Try to walk each day. Start by walking a little more than you did the day before. Bit by bit, increase the amount you walk. Walking boosts blood flow and helps prevent pneumonia, constipation, and blood clots.     Avoid strenuous activities, such as bicycle riding, jogging, weightlifting, and aerobic exercise, for 6 weeks or until your doctor says it is okay.     Until your doctor says it is okay, do not lift anything heavier than your baby.     Do not do sit-ups or other exercises that strain the belly muscles for 6 weeks or until your doctor says it is okay.     Hold a pillow over your incision when you cough or take deep breaths. This will support your belly and decrease your pain.     You may shower as usual. Pat the incision dry when you are done.     You  will have some vaginal bleeding. Wear sanitary pads. Do not douche or use tampons until your doctor says it is okay.     Ask your doctor when you can drive again.     You will probably need to take at least 6 weeks off work. It depends on the type of work you do and how you feel.     Ask your doctor when it is okay for you to have sex.   Diet    You can eat your normal diet. If your stomach is upset, try bland, low-fat foods like plain rice, broiled chicken, toast, and yogurt.     Drink plenty of fluids (unless your doctor tells you not to).     You may notice that your bowel movements are not regular right after your surgery. This is common. Try to avoid constipation and straining with bowel movements. You may want to take a fiber supplement every day. If you have not had a bowel movement after a couple of days, ask your doctor about taking a mild laxative.     If you are breastfeeding, limit alcohol. Alcohol can cause a lack of energy and other health problems for the baby when a breastfeeding woman drinks heavily. It can also get in the way of a mom's ability to feed her baby or to care for the child in other ways. There isn't a lot of research about exactly how much alcohol can harm a baby. Having no alcohol is the safest choice for your baby. If you choose to have a drink now and then, have only one drink, and limit the number of occasions that you have a drink. Wait to breastfeed at least 2 hours after you have a drink to reduce the amount of alcohol the baby may get in the milk.   Medicines    Your doctor will tell you if and when you can restart your medicines. You will also get instructions about taking any new medicines.     If you stopped taking aspirin or some other blood thinner, your doctor will tell you when to start taking it again.     Take pain medicines exactly as directed.  If the doctor gave you a prescription medicine for pain, take it as prescribed.  If you are not taking a prescription pain  medicine, ask your doctor if you can take an over-the-counter medicine.     If you think your pain medicine is making you sick to your stomach:  Take your medicine after meals (unless your doctor has told you not to).  Ask your doctor for a different pain medicine.     If your doctor prescribed antibiotics, take them as directed. Do not stop taking them just because you feel better. You need to take the full course of antibiotics.   Incision care    If you have strips of tape on the incision, leave the tape on for a week or until it falls off.     Wash the area daily with warm, soapy water, and pat it dry. Don't use hydrogen peroxide or alcohol, which can slow healing. You may cover the area with a gauze bandage if it weeps or rubs against clothing. Change the bandage every day.     Keep the area clean and dry.   Other instructions    If you breastfeed your baby, you may be more comfortable while you are healing if you don't rest your baby on your belly. Try tucking your baby under your arm, with your baby's body along the side you will be feeding on. Support your baby's upper body with your arm. With that hand you can control your baby's head to bring your baby's mouth to your breast. This is sometimes called the football hold.   Follow-up care is a key part of your treatment and safety. Be sure to make and go to all appointments, and call your doctor if you are having problems. It's also a good idea to know your test results and keep a list of the medicines you take.  When should you call for help?  Share this information with your partner, family, or a friend. They can help you watch for warning signs.  Call 911  anytime you think you may need emergency care. For example, call if:    You have thoughts of harming yourself, your baby, or another person.     You passed out (lost consciousness).     You have chest pain, are short of breath, or cough up blood.     You have a seizure.   Where to get help 24 hours a  day, 7 days a week   If you or someone you know talks about suicide, self-harm, a mental health crisis, a substance use crisis, or any other kind of emotional distress, get help right away. You can:    Call the Suicide and Crisis Lifeline at 988.     Call 4-693-910-TALK (1-781.378.3253).     Text HOME to 304181 to access the Crisis Text Line.   Consider saving these numbers in your phone.  Go to [employer] for more information or to chat online.  Call your doctor now or seek immediate medical care if:    You have loose stitches, or your incision comes open.     You have signs of hemorrhage (too much bleeding), such as:  Heavy vaginal bleeding. This means that you are soaking through one or more pads in an hour. Or you pass blood clots bigger than an egg.  Feeling dizzy or lightheaded, or you feel like you may faint.  Feeling so tired or weak that you cannot do your usual activities.  A fast or irregular heartbeat.  New or worse belly pain.     You have symptoms of infection, such as:  Increased pain, swelling, warmth, or redness.  Red streaks leading from the incision.  Pus draining from the incision.  A fever.  Frequent or painful urination or blood in your urine.  Vaginal discharge that smells bad.  New or worse belly pain.     You have symptoms of a blood clot in your leg (called a deep vein thrombosis), such as:  Pain in the calf, back of the knee, thigh, or groin.  Swelling in the leg or groin.  A color change on the leg or groin. The skin may be reddish or purplish, depending on your usual skin color.     You have signs of preeclampsia, such as:  Sudden swelling of your face, hands, or feet.  New vision problems (such as dimness, blurring, or seeing spots).  A severe headache.     You have signs of heart failure, such as:  New or increased shortness of breath.  New or worse swelling in your legs, ankles, or feet.  Sudden weight gain, such as more than 2 to 3 pounds in a day or 5 pounds in a week.  Feeling  "so tired or weak that you cannot do your usual activities.     You had spinal or epidural pain relief and have:  New or worse back pain.  Increased pain, swelling, warmth, or redness at the injection site.  Tingling, weakness, or numbness in your legs or groin.   Watch closely for changes in your health, and be sure to contact your doctor if:    Your vaginal bleeding isn't decreasing.     You feel sad, anxious, or hopeless for more than a few days.     You are having problems with your breasts or breastfeeding.   Where can you learn more?  Go to https://www.riskmethods.net/patiented  Enter M806 in the search box to learn more about \" Section: What to Expect at Home.\"  Current as of: 2023               Content Version: 13.8    5025-5136 [employer].   Care instructions adapted under license by your healthcare professional. If you have questions about a medical condition or this instruction, always ask your healthcare professional. [employer] disclaims any warranty or liability for your use of this information.      "

## 2024-03-06 NOTE — PLAN OF CARE
Vital signs within normal limits. Postpartum checks within normal limits - see flow record. Patient eating and drinking normally. Patient able to empty bladder independently and is up ambulating. Patient performing self cares, and is pumping for infant in NICU. Incision WDL. Patient medicated with ibuprofen and tylenol during the shift for pain. See MAR. Adequate pain control noted by patient. Patient education done, see flow record. Positive attachment behaviors observed with infant. Patient's spouse present this shift.       Discharge instructions completed.  Patient states she understands all discharge instructions and all her questions have been answered.  Verbalizes when she needs to return to clinic for follow up for herself and baby.  She is caring for herself and her baby independently.  Prescriptions reviewed and sent to pharmacy.  Postpartum depression symptoms reviewed and encouraged frequent review of depression scale. Patient discharged home with family transporting at 1330.

## 2024-03-06 NOTE — LACTATION NOTE
Lactation check in prior to discharge- Laura declined lactation visit yesterday.   Laura discharging from postpartum unit- infant in NICU.  Laura pumping 30+ ml- reviewed switching to maintain mode on pump.  Encouraged to pump in NICU when visiting infant and use hospital grade pump; Laura has spectra pump for home use.  Reinforced importance of q3 hour pumping and education provided on breast milk supply and demand.   Laura aware lactation support available in NICU. No further questions at this time.

## 2024-03-06 NOTE — PLAN OF CARE
Vital signs stable. Postpartum assessment WDL. Reflexes and clonus WDL. Uterine fundus is firm and midline. Scant vaginal bleeding. Using Tylenol and Ibuprofen for pain with good relief. Incision assessment WDL and intact with adhesive strips and barrier film. Up and ambulating; free of dizziness. Voiding w/o difficulty. I/Os are being monitored. Tolerating a regular diet. Pumping for baby in NICU. Will continue to monitor. Anxious regarding discharge, hoping to leave hospital ASAP. Questions/concerns addressed.

## 2024-03-28 NOTE — DISCHARGE SUMMARY
DATE OF ADMISSION: 2024    DATE OF DISCHARGE: 3/6/2024    ADMISSION DIAGNOSIS:   Intrauterine pregnancy at 37 w 0 d  2.    Induction of labor for pre-eclampsia without severe features    DISCHARGE DIAGNOSIS:   Intrauterine pregnancy at 37 w 0 d  2.    Induction of labor for pre-eclampsia without severe features  3.    Status post primary low transverse  section for failed induction    ATTENDING: Pérez Fajardo MD    SUMMARY OF HOSPITAL COURSE:   The patient is a 28 year old  who was admitted to labor and delivery at 37w0d for an induction of labor for pre-eclampsia without severe features.  Upon admission her cervix was unfavorable and she underwent cervical ripening for 24 hours without cervical change.  Option of further cervical ripening vs  section were reviewed.  Patient desired to proceed with  section.  A primary low transverse  section was performed without issues.  Please see operative report for further details. During her post-operative course, she made satisfactory advancement in diet and ambulation with return of bowel function and adequate pain control.  Initially her blood pressures were stable on nifedipine 30 mg XL Qday (was on 60 mg XL during pregnancy).  However, she did spike severe range BPs post-partum, and nifedipine was increased to 60 mg XL Qday and patient was also restarted on labetalol 100 mg BID.  By post-op day 4, she was deemed stable.  She was discharged home on 3/6/2024.    CONDITION ON DISCHARGE: Stable      MEDICATIONS ON DISCHARGE:      Medication List        Started      acetaminophen 325 MG tablet  Commonly known as: TYLENOL  975 mg, Oral, EVERY 6 HOURS     ibuprofen 800 MG tablet  Commonly known as: ADVIL/MOTRIN  800 mg, Oral, EVERY 6 HOURS     labetalol 100 MG tablet  Commonly known as: NORMODYNE  100 mg, Oral, EVERY 12 HOURS            Modified      NIFEdipine ER 60 MG 24 hr tablet  Commonly known as: ADALAT CC  60 mg, Oral,  DAILY  What changed:   medication strength  how much to take  Another medication with the same name was removed. Continue taking this medication, and follow the directions you see here.            Discontinued      aspirin 81 MG chewable tablet  Commonly known as: ASA              DISCHARGE INSTRUCTIONS: The patient was instructed to be on pelvic rest with no heavy lifting >25 lb for 6 weeks.  She was instructed to follow-up in the office within 3 days for a blood pressure check, and 2 weeks for post-op exam.  She was instructed to call or return sooner if she has fever >/= 100.4 degrees Farenheit (38.0 degrees Celsius), severe worsening abdominal pain (not relieved by oral pain medications), heavy persistent vaginal bleeding, chest pain, palpitations, shortness of breath, dizziness, depressed mood, persistent headaches, visual changes, or for any other major concern.      DONNY TATE MD

## 2024-04-20 ENCOUNTER — HEALTH MAINTENANCE LETTER (OUTPATIENT)
Age: 29
End: 2024-04-20

## 2025-05-11 ENCOUNTER — HEALTH MAINTENANCE LETTER (OUTPATIENT)
Age: 30
End: 2025-05-11

## (undated) DEVICE — SYR TRANSFER DEVICE BLOOD NDL HOLDER 3648800

## (undated) DEVICE — CATH TRAY FOLEY SURESTEP 16FR DRAIN BAG STATOCK A899916

## (undated) DEVICE — SU MONOCRYL 4-0 PS-2 27" UND Y426H

## (undated) DEVICE — DRSG TEGADERM 4X10" 1627

## (undated) DEVICE — GLOVE BIOGEL PI MICRO SZ 7.0 48570

## (undated) DEVICE — GLOVE BIOGEL PI MICRO INDICATOR UNDERGLOVE SZ 7.0 48970

## (undated) DEVICE — ESU GROUND PAD ADULT W/CORD E7507

## (undated) DEVICE — SOL ADH LIQUID BENZOIN SWAB 0.6ML C1544

## (undated) DEVICE — LINEN TOWEL PACK X10 5473

## (undated) DEVICE — LINEN HALF SHEET 5512

## (undated) DEVICE — PREP CHLORAPREP 26ML TINTED HI-LITE ORANGE 930815

## (undated) DEVICE — ESU PENCIL SMOKE EVAC W/ROCKER SWITCH 0703-047-000

## (undated) DEVICE — BAG CLEAR TRASH 1.3M 39X33" P4040C

## (undated) DEVICE — DRSG STERI STRIP 1/2X4" R1547

## (undated) DEVICE — GLOVE BIOGEL PI MICRO SZ 6.5 48565

## (undated) DEVICE — PAD CHUX UNDERPAD 30X36" P3036C

## (undated) DEVICE — STPL SKIN SUBCUTICULAR INSORB  2030

## (undated) DEVICE — DRAPE IOBAN C-SECTION W/POUCH 30X35" 6657

## (undated) DEVICE — SOL WATER IRRIG 1000ML BOTTLE 2F7114

## (undated) DEVICE — STOCKING SLEEVE VASOPRESS COMPRESSION CALF MED 18" VP501M

## (undated) DEVICE — SU VICRYL 0 CT-1 36" J346H

## (undated) DEVICE — LINEN FULL SHEET 5511

## (undated) DEVICE — PACK C-SECTION LF PL15OTA83B

## (undated) DEVICE — CAP BABY PINK/BLUE IC-2

## (undated) DEVICE — LINEN DRAPE 54X72" 5467

## (undated) DEVICE — BLADE CLIPPER SGL USE 9680

## (undated) DEVICE — SOL NACL 0.9% IRRIG 1000ML BOTTLE 2F7124

## (undated) RX ORDER — MORPHINE SULFATE 1 MG/ML
INJECTION, SOLUTION EPIDURAL; INTRATHECAL; INTRAVENOUS
Status: DISPENSED
Start: 2024-03-02

## (undated) RX ORDER — KETOROLAC TROMETHAMINE 30 MG/ML
INJECTION, SOLUTION INTRAMUSCULAR; INTRAVENOUS
Status: DISPENSED
Start: 2024-03-02

## (undated) RX ORDER — FENTANYL CITRATE 50 UG/ML
INJECTION, SOLUTION INTRAMUSCULAR; INTRAVENOUS
Status: DISPENSED
Start: 2024-03-02